# Patient Record
Sex: FEMALE | Race: WHITE | NOT HISPANIC OR LATINO | Employment: STUDENT | URBAN - METROPOLITAN AREA
[De-identification: names, ages, dates, MRNs, and addresses within clinical notes are randomized per-mention and may not be internally consistent; named-entity substitution may affect disease eponyms.]

---
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
## 2023-01-05 ENCOUNTER — APPOINTMENT (OUTPATIENT)
Dept: LAB | Facility: CLINIC | Age: 18
End: 2023-01-05

## 2023-01-05 ENCOUNTER — ROUTINE PRENATAL (OUTPATIENT)
Dept: OBGYN CLINIC | Age: 18
End: 2023-01-05

## 2023-01-05 ENCOUNTER — TELEPHONE (OUTPATIENT)
Dept: OBGYN CLINIC | Facility: CLINIC | Age: 18
End: 2023-01-05

## 2023-01-05 VITALS
DIASTOLIC BLOOD PRESSURE: 80 MMHG | HEIGHT: 60 IN | BODY MASS INDEX: 24.54 KG/M2 | WEIGHT: 125 LBS | SYSTOLIC BLOOD PRESSURE: 116 MMHG

## 2023-01-05 DIAGNOSIS — Z3A.30 30 WEEKS GESTATION OF PREGNANCY: ICD-10-CM

## 2023-01-05 DIAGNOSIS — Z3A.30 30 WEEKS GESTATION OF PREGNANCY: Primary | ICD-10-CM

## 2023-01-05 DIAGNOSIS — O99.013 ANEMIA AFFECTING PREGNANCY IN THIRD TRIMESTER: ICD-10-CM

## 2023-01-05 LAB
ABO GROUP BLD: NORMAL
BLD GP AB SCN SERPL QL: NEGATIVE
DME PARACHUTE DELIVERY DATE REQUESTED: NORMAL
DME PARACHUTE ITEM DESCRIPTION: NORMAL
DME PARACHUTE ORDER STATUS: NORMAL
DME PARACHUTE SUPPLIER NAME: NORMAL
DME PARACHUTE SUPPLIER PHONE: NORMAL
RH BLD: NEGATIVE
SL AMB  POCT GLUCOSE, UA: NEGATIVE
SL AMB POCT URINE PROTEIN: NEGATIVE
SPECIMEN EXPIRATION DATE: NORMAL

## 2023-01-05 RX ORDER — FERROUS SULFATE TAB EC 324 MG (65 MG FE EQUIVALENT) 324 (65 FE) MG
324 TABLET DELAYED RESPONSE ORAL
Qty: 30 TABLET | Refills: 2 | Status: SHIPPED | OUTPATIENT
Start: 2023-01-05

## 2023-01-05 NOTE — PROGRESS NOTES
Patient is here for prenatal ob visit today  No question's or concerns at this time  GA: 30w4d  Estimated Date of Delivery: 3/12/23    Urine: Protein Negative / Glucose Negative  Denies loss of fluid, vaginal bleeding and contractions  Good fetal movement  Labs are completed and up to date  Declines Flu and Tdap vaccines

## 2023-01-05 NOTE — PATIENT INSTRUCTIONS
Pregnancy at 31 to 34 Weeks   AMBULATORY CARE:   Changes happening with your body: You may continue to have symptoms such as shortness of breath, heartburn, contractions, or swelling of your ankles and feet  You may be gaining about 1 pound a week now  Seek care immediately if:   You develop a severe headache that does not go away  You have new or increased vision changes, such as blurred or spotted vision  You have new or increased swelling in your face or hands  You have vaginal spotting or bleeding  Your water broke or you feel warm water gushing or trickling from your vagina  Call your obstetrician if:   You have more than 5 contractions in 1 hour  You notice any changes in your baby's movements  You have abdominal cramps, pressure, or tightening  You have a change in vaginal discharge  You have chills or a fever  You have vaginal itching, burning, or pain  You have yellow, green, white, or foul-smelling vaginal discharge  You have pain or burning when you urinate, less urine than usual, or pink or bloody urine  You have questions or concerns about your condition or care  How to care for yourself at this stage of your pregnancy:       Eat a variety of healthy foods  Healthy foods include fruits, vegetables, whole-grain breads, low-fat dairy foods, beans, lean meats, and fish  Drink liquids as directed  Ask how much liquid to drink each day and which liquids are best for you  Limit caffeine to less than 200 milligrams each day  Limit your intake of fish to 2 servings each week  Choose fish low in mercury such as canned light tuna, shrimp, salmon, cod, or tilapia  Do not  eat fish high in mercury such as swordfish, tilefish, von mackerel, and shark  Manage heartburn  by eating 4 or 5 small meals each day instead of large meals  Avoid spicy food  Manage swelling  by lying down and putting your feet up  Take prenatal vitamins as directed    Your need for certain vitamins and minerals, such as folic acid, increases during pregnancy  Prenatal vitamins provide some of the extra vitamins and minerals you need  Prenatal vitamins may also help to decrease the risk of certain birth defects  Talk to your healthcare provider about exercise  Moderate exercise can help you stay fit  Your healthcare provider will help you plan an exercise program that is safe for you during pregnancy  Do not smoke  Smoking increases your risk of a miscarriage and other health problems during your pregnancy  Smoking can cause your baby to be born too early or weigh less at birth  Ask your healthcare provider for information if you need help quitting  Do not drink alcohol  Alcohol passes from your body to your baby through the placenta  It can affect your baby's brain development and cause fetal alcohol syndrome (FAS)  FAS is a group of conditions that causes mental, behavior, and growth problems  Talk to your healthcare provider before you take any medicines  Many medicines may harm your baby if you take them when you are pregnant  Do not take any medicines, vitamins, herbs, or supplements without first talking to your healthcare provider  Never use illegal or street drugs (such as marijuana or cocaine) while you are pregnant  Safety tips during pregnancy:   Avoid hot tubs and saunas  Do not use a hot tub or sauna while you are pregnant, especially during your first trimester  Hot tubs and saunas may raise your baby's temperature and increase the risk of birth defects  Avoid toxoplasmosis  This is an infection caused by eating raw meat or being around infected cat feces  It can cause birth defects, miscarriages, and other problems  Wash your hands after you touch raw meat  Make sure any meat is well-cooked before you eat it  Avoid raw eggs and unpasteurized milk  Use gloves or ask someone else to clean your cat's litter box while you are pregnant  Changes happening with your baby:  By 34 weeks, your baby may weigh more than 5 pounds  Your baby will be about 12 ½ inches long from the top of the head to the rump (baby's bottom)  Your baby is gaining about ½ pound a week  Your baby's eyes open and close now  Your baby's kicks and movements are more forceful at this time  What you need to know about prenatal care: Your healthcare provider will check your blood pressure and weight  You may also need the following:  A urine test  may also be done to check for sugar and protein  These can be signs of gestational diabetes or infection  Protein in your urine may also be a sign of preeclampsia  Preeclampsia is a condition that can develop during week 20 or later of your pregnancy  It causes high blood pressure, and it can cause problems with your kidneys and other organs  A gestational diabetes screen  may be done  Your healthcare provider may order either a 1-step or 2-step oral glucose tolerance test (OGTT)  1-step OGTT:  Your blood sugar level will be tested after you have not eaten for 8 hours (fasting)  You will then be given a glucose drink  Your level will be tested again 1 hour and 2 hours after you finish the drink  2-step OGTT:  You do not have to fast for the first part of the test  You will have the glucose drink at any time of day  Your blood sugar level will be checked 1 hour later  If your blood sugar is higher than a certain level, another test will be ordered  You will fast and your blood sugar level will be tested  You will have the glucose drink  Your blood will be tested again 1 hour, 2 hours, and 3 hours after you finish the glucose drink  A Tdap vaccine  may be recommended by your healthcare provider  Fundal height  is a measurement of your uterus to check your baby's growth  This number is usually the same as the number of weeks that you have been pregnant   Your healthcare provider may also check your baby's position  Your baby's heart rate  will be checked  Follow up with your obstetrician as directed:  Write down your questions so you remember to ask them during your visits  © Copyright [employer]  Information is for End User's use only and may not be sold, redistributed or otherwise used for commercial purposes  All illustrations and images included in CareNotes® are the copyrighted property of A D A M , Inc  or Kari Payan   The above information is an  only  It is not intended as medical advice for individual conditions or treatments  Talk to your doctor, nurse or pharmacist before following any medical regimen to see if it is safe and effective for you   Labor   AMBULATORY CARE:    (premature) labor  occurs when the uterus contracts and your cervix opens earlier than normal  The cervix is the opening of your uterus   labor happens after the 20th week of pregnancy but before the 37th week  You may have premature rupture of membranes (PROM)  PROM means your water broke before labor began  An early labor could cause you to have your baby before he or she is ready to be born  Common signs and symptoms include the following: You may not know that you are having  labor  It is common to have  contractions (tightening and relaxing of the uterus) and not notice them  The following are signs and symptoms that suggest a  labor:  Contractions that get stronger and closer together    Changes in vaginal discharge, such as more discharge or discharge that is watery or bloody     Low back pain     Pressure in the lower abdomen     Vaginal spotting or bleeding    Call your local emergency number (911 in the [address] [facility] [facility]) if:   You see or feel like there is something in your vagina  Call your doctor if:   You have bright red, painless vaginal bleeding  Your symptoms do not get better or they get worse      Your water broke or you feel warm water gushing or trickling from your vagina  You have contractions that get stronger and closer together for more than 1 hour  You notice a decrease in your baby's movement  You have abdominal cramps, pressure, or tightening  You have a change in vaginal discharge  You have a fever  You have burning when you urinate or you are urinating less than is normal for you  You have questions or concerns about your condition or care  How  labor is diagnosed: You may have one or more of the following tests to check for  labor:  A pelvic exam  is also called an internal or vaginal exam  During a pelvic exam, your healthcare provider will gently put a warmed speculum into your vagina  A speculum is a tool that opens your vagina  This lets your healthcare provider see if your cervix is opening  A vaginal ultrasound  uses sound waves to show pictures of your cervix and your baby inside your uterus  During this test, a small tube is placed into your vagina  This test will help your healthcare provider see if your cervix is opening  A fetal ultrasound  uses sound waves to show pictures of your baby inside your uterus  The movement, heart rate, and position of your baby can also be seen  A fetal fibronectin test  checks for a protein called fetal fibronectin in the cervix or vagina  Normally, there is no protein in cervical and vaginal secretions until the 20th week of pregnancy up to the end of pregnancy  Blood and urine tests  may be done to look for signs of infection  Treatment for  labor  may delay delivery  You may need any of the following:  Bed rest  may be recommended  You may need to lie on your left side, which improves circulation to the uterus and baby  Your healthcare provider will tell you when it is okay to get out of bed  Medicine  may be given to stop contractions if your baby is not ready to be born   You may also need certain medicines if your  labor cannot be stopped and your healthcare provider thinks you will have your baby early  These medicines help your baby's lungs, brain, and digestive organs mature  They also help decrease your baby's risk of being born with cerebral palsy  If you have PROM, fluid from your vagina or rectum will be checked for a strep infection  You may be given antibiotics to prevent a strep infection during delivery  Antibiotics may also be used to prevent labor from starting  You may also need steroids to decrease the risk for complications due to  labor  Self-care:   Rest  as much as possible  You may need to lie on your left side to improve circulation to the uterus and baby  You may be able to prevent  labor by resting and reducing your physical activity  Ask your healthcare provider about activities that are safe for you to do  Your healthcare provider or obstetrician may recommend that you avoid sexual intercourse  Ask your healthcare provider if exercise is safe  Drink liquids as directed  Ask how much liquid to drink each day and which liquids are best for you  Do not smoke  Your baby may not grow well and he or she may weigh less at birth if you smoke during pregnancy  Smoking also increases the risk that your baby will be born too early  Nicotine and other chemicals in cigarettes and cigars can cause lung damage  Ask your healthcare provider for information if you currently smoke and need help to quit  E-cigarettes or smokeless tobacco still contain nicotine  Talk to your healthcare provider before you use these products  Do not drink alcohol  Alcohol may harm your unborn baby and cause  labor  Maintain a healthy weight  A healthy weight may prevent  labor  Ask your healthcare provider how much weight you should gain during your pregnancy      Follow up with your doctor as directed:  Write down your questions so you remember to ask them during your visits  © Copyright [employer] 2022 Information is for End User's use only and may not be sold, redistributed or otherwise used for commercial purposes  All illustrations and images included in CareNotes® are the copyrighted property of A D A M , Inc  or Kari Park  The above information is an  only  It is not intended as medical advice for individual conditions or treatments  Talk to your doctor, nurse or pharmacist before following any medical regimen to see if it is safe and effective for you

## 2023-01-05 NOTE — PROGRESS NOTES
Problem   30 Weeks Gestation of Pregnancy       GC/CT -  Neg/neg  PN1 Labs: nml but no T+S   - 28 Week Labs- mild anemia  On oral iron  T&S not done - reordered today  - COVID vaccine- declined  Did not review increased risk of maternal/fetal complication today  - Flu/tdap vaccine -declined  - delivery consent signed       30 weeks gestation of pregnancy  Silver Crabtree is a 21 y o    30w4d who presents for routine PNV  28 week labs reviewed: mild anemia  Not currently taking oral iron supplements  Ordered today  Needs T&S  Opted for 7 days of fasting and postprandial glucose checks  Patient showed blood sugar log from her phone - fasting levels <95  Only 2 slightly elevated postprandial values at 123 and 126 after breakfast  Patient states both days she had fruit and syrup with her meal  Otherwise within normal limits  Advised patient she can discontinue finger sticks  Discussed avoiding excess sugar and carbs and making good food choices going forward  I advised patient to screenshot and submit the blood sugar log through [employer] for proper record keeping  TWG appropriate for prepregnancy BMI  T&S reordered  Expressed importance of completing this lab work as soon as possible  Patient agrees to go to lab immediately after this visit  Patient is aware she will have to return for rhogam injection if Rh negative  Prepregnancy BMI 19 33 with a goal weight gain 11 5 kg (25 lb)-16 kg (35 lb)  TWG 11 8 kg (26 lb)  Denies OB complaints  Good fetal movement  Denies contractions, cramping, leakage of fluid or vaginal bleeding  Declined tdap/flu/covid vaccines  Breast pump order submitted  Reviewed  labor precautions and FKCs     Pregnancy Essential guide and Baby and Me web site recommended

## 2023-01-05 NOTE — ASSESSMENT & PLAN NOTE
Cassidy Weinstein is a 21 y o    30w4d who presents for routine PNV  28 week labs reviewed: mild anemia  Not currently taking oral iron supplements  Ordered today  Needs T&S  Opted for 7 days of fasting and postprandial glucose checks  Patient showed blood sugar log from her phone - fasting levels <95  Only 2 slightly elevated postprandial values at 123 and 126 after breakfast  Patient states both days she had fruit and syrup with her meal  Otherwise within normal limits  Advised patient she can discontinue finger sticks  Discussed avoiding excess sugar and carbs and making good food choices going forward  I advised patient to screenshot and submit the blood sugar log through [employer] for proper record keeping  TWG appropriate for prepregnancy BMI  T&S reordered  Expressed importance of completing this lab work as soon as possible  Patient agrees to go to lab immediately after this visit  Patient is aware she will have to return for rhogam injection if Rh negative  Prepregnancy BMI 19 33 with a goal weight gain 11 5 kg (25 lb)-16 kg (35 lb)  TWG 11 8 kg (26 lb)  Denies OB complaints  Good fetal movement  Denies contractions, cramping, leakage of fluid or vaginal bleeding  Declined tdap/flu/covid vaccines  Breast pump order submitted  Reviewed  labor precautions and FKCs     Pregnancy Essential guide and Baby and Me web site recommended

## 2023-01-05 NOTE — TELEPHONE ENCOUNTER
Spoke to pt and she said she got through to someone to make an appt- that is what she wanted  Reminded her if she leaves vm in future to leave her name and  with phone # so we can find her  Her # does not for some reason read aloud

## 2023-01-05 NOTE — TELEPHONE ENCOUNTER
Pt lm on OB vm ( her phone # is not dictated by the system- says "external #" only)  Pt only stated her name- no reason for call

## 2023-01-06 ENCOUNTER — TELEPHONE (OUTPATIENT)
Dept: OBGYN CLINIC | Facility: CLINIC | Age: 18
End: 2023-01-06

## 2023-01-06 NOTE — TELEPHONE ENCOUNTER
----- Message from Denzel De La Cruz sent at 1/6/2023  7:27 AM EST -----  Blood type O negative  Needs rhogam injection

## 2023-01-06 NOTE — TELEPHONE ENCOUNTER
----- Message from [address] Christo Jansen sent at 1/6/2023  2:13 PM EST -----  Regarding: re appointment  Contact: [phone]  Yes, Jeremias Tapia been busy today so I wasn’t able to make it in today  So I will have to schedule for next week  Also I have a question, what if the baby was  a positive blood type, that would still affect me? That would mean I would still need it?

## 2023-01-06 NOTE — TELEPHONE ENCOUNTER
Called pt- informed pt she will need rhogam asap, & recommend she reschedule her missed appt today  Advised pt that baby's blood will be tested after june & if Rh negative, will not need a 2nd rhogam injection after june; if Rh positive, will need another rhogam injection PP  Pt states she will call back today to reschedule appt

## 2023-01-10 ENCOUNTER — CLINICAL SUPPORT (OUTPATIENT)
Dept: OBGYN CLINIC | Age: 18
End: 2023-01-10

## 2023-01-10 VITALS
HEIGHT: 60 IN | WEIGHT: 125.6 LBS | SYSTOLIC BLOOD PRESSURE: 118 MMHG | BODY MASS INDEX: 24.66 KG/M2 | DIASTOLIC BLOOD PRESSURE: 72 MMHG

## 2023-01-10 DIAGNOSIS — O26.893 RH NEGATIVE STATUS DURING PREGNANCY IN THIRD TRIMESTER: Primary | ICD-10-CM

## 2023-01-10 DIAGNOSIS — Z67.91 RH NEGATIVE STATUS DURING PREGNANCY IN THIRD TRIMESTER: Primary | ICD-10-CM

## 2023-01-10 NOTE — PROGRESS NOTES
Pt is currently 31wks pregnant     Pt is here for Rhogam injection   No concerns at this time  O Negative     Given Right Buttock  Pt tolerated well

## 2023-01-15 NOTE — PATIENT INSTRUCTIONS
Thank you for choosing us for your  care today  If you have any questions about your ultrasound or care, please do not hesitate to contact us or your primary obstetrician  Some general instructions for your pregnancy are:    Exercise: Aim for 22 minutes per day (150 minutes per week) of regular exercise  Walking is great! Nutrition: Choose healthy sources of calcium, iron, and protein  Learn about Preeclampsia: preeclampsia is a common, serious high blood pressure complication in pregnancy  A blood pressure of 850PCML (systolic or top number) or 45WBZE (diastolic or bottom number) is not normal and needs evaluation by your doctor  Aspirin is sometimes prescribed in early pregnancy to prevent preeclampsia in women with risk factors - ask your obstetrician if you should be on this medication  For more resources, visit:  https://mothertobaby org/fact-sheets/low-dose-aspirin/  [employer] com cy  https://www highriskpregnancyinfo org/preeclampsia  If you smoke, try to reduce how many cigarettes you smoke or try to quit completely  Do not vape  Other warning signs to watch out for in pregnancy or postpartum: chest pain, obstructed breathing or shortness of breath, seizures, thoughts of hurting yourself or your baby, bleeding, a painful or swollen leg, fever, or headache (see [facility] POST-BIRTH Warning Signs campaign)  If these happen call 911  Itching is also not normal in pregnancy and if you experience this, especially over your hands and feet, potentially worse at night, notify your doctors

## 2023-01-16 PROBLEM — Z3A.32 32 WEEKS GESTATION OF PREGNANCY: Status: ACTIVE | Noted: 2022-08-29

## 2023-01-16 NOTE — ASSESSMENT & PLAN NOTE
She feels well  She denies LOF/CTX/VB  She did not have any concerns today  We discussed fetal kick counting  Heading to her MFM appt next

## 2023-01-16 NOTE — PATIENT INSTRUCTIONS
Pregnancy at [address] to [address]:   What changes are happening with my body? You may continue to have symptoms such as shortness of breath, heartburn, contractions, or swelling of your ankles and feet  You may be gaining about 1 pound a week now  How do I care for myself at this stage of my pregnancy? Eat a variety of healthy foods  Healthy foods include fruits, vegetables, whole-grain breads, low-fat dairy foods, beans, lean meats, and fish  Drink liquids as directed  Ask how much liquid to drink each day and which liquids are best for you  Limit caffeine to less than 200 milligrams each day  Limit your intake of fish to 2 servings each week  Choose fish low in mercury such as canned light tuna, shrimp, salmon, cod, or tilapia  Do not  eat fish high in mercury such as swordfish, tilefish, von mackerel, and shark  Manage heartburn  by eating 4 or 5 small meals each day instead of large meals  Avoid spicy food  Manage swelling  by lying down and putting your feet up  Take prenatal vitamins as directed  Your need for certain vitamins and minerals, such as folic acid, increases during pregnancy  Prenatal vitamins provide some of the extra vitamins and minerals you need  Prenatal vitamins may also help to decrease the risk of certain birth defects  Talk to your healthcare provider about exercise  Moderate exercise can help you stay fit  Your healthcare provider will help you plan an exercise program that is safe for you during pregnancy  Do not smoke  Smoking increases your risk of a miscarriage and other health problems during your pregnancy  Smoking can cause your baby to be born too early or weigh less at birth  Ask your healthcare provider for information if you need help quitting  Do not drink alcohol  Alcohol passes from your body to your baby through the placenta   It can affect your baby's brain development and cause fetal alcohol syndrome (FAS)  FAS is a group of conditions that causes mental, behavior, and growth problems  Talk to your healthcare provider before you take any medicines  Many medicines may harm your baby if you take them when you are pregnant  Do not take any medicines, vitamins, herbs, or supplements without first talking to your healthcare provider  Never use illegal or street drugs (such as marijuana or cocaine) while you are pregnant  What are some safety tips during pregnancy? Avoid hot tubs and saunas  Do not use a hot tub or sauna while you are pregnant, especially during your first trimester  Hot tubs and saunas may raise your baby's temperature and increase the risk of birth defects  Avoid toxoplasmosis  This is an infection caused by eating raw meat or being around infected cat feces  It can cause birth defects, miscarriages, and other problems  Wash your hands after you touch raw meat  Make sure any meat is well-cooked before you eat it  Avoid raw eggs and unpasteurized milk  Use gloves or ask someone else to clean your cat's litter box while you are pregnant  What changes are happening with my baby? By 34 weeks, your baby may weigh more than 5 pounds  Your baby will be about 12 ½ inches long from the top of the head to the rump (baby's bottom)  Your baby is gaining about ½ pound a week  Your baby's eyes open and close now  Your baby's kicks and movements are more forceful at this time  What do I need to know about prenatal care? Your healthcare provider will check your blood pressure and weight  You may also need the following:  A urine test  may also be done to check for sugar and protein  These can be signs of gestational diabetes or infection  Protein in your urine may also be a sign of preeclampsia  Preeclampsia is a condition that can develop during week 20 or later of your pregnancy  It causes high blood pressure, and it can cause problems with your kidneys and other organs      A gestational diabetes screen  may be done  Your healthcare provider may order either a 1-step or 2-step oral glucose tolerance test (OGTT)  1-step OGTT:  Your blood sugar level will be tested after you have not eaten for 8 hours (fasting)  You will then be given a glucose drink  Your level will be tested again 1 hour and 2 hours after you finish the drink  2-step OGTT:  You do not have to fast for the first part of the test  You will have the glucose drink at any time of day  Your blood sugar level will be checked 1 hour later  If your blood sugar is higher than a certain level, another test will be ordered  You will fast and your blood sugar level will be tested  You will have the glucose drink  Your blood will be tested again 1 hour, 2 hours, and 3 hours after you finish the glucose drink  A Tdap vaccine  may be recommended by your healthcare provider  Fundal height  is a measurement of your uterus to check your baby's growth  This number is usually the same as the number of weeks that you have been pregnant  Your healthcare provider may also check your baby's position  Your baby's heart rate  will be checked  When should I seek immediate care? You develop a severe headache that does not go away  You have new or increased vision changes, such as blurred or spotted vision  You have new or increased swelling in your face or hands  You have vaginal spotting or bleeding  Your water broke or you feel warm water gushing or trickling from your vagina  When should I call my obstetrician? You have more than 5 contractions in 1 hour  You notice any changes in your baby's movements  You have abdominal cramps, pressure, or tightening  You have a change in vaginal discharge  You have chills or a fever  You have vaginal itching, burning, or pain  You have yellow, green, white, or foul-smelling vaginal discharge      You have pain or burning when you urinate, less urine than usual, or pink or bloody urine  You have questions or concerns about your condition or care  CARE AGREEMENT:   You have the right to help plan your care  Learn about your health condition and how it may be treated  Discuss treatment options with your healthcare providers to decide what care you want to receive  You always have the right to refuse treatment  The above information is an  only  It is not intended as medical advice for individual conditions or treatments  Talk to your doctor, nurse or pharmacist before following any medical regimen to see if it is safe and effective for you  © Copyright [employer] 2022 Information is for End User's use only and may not be sold, redistributed or otherwise used for commercial purposes   All illustrations and images included in CareNotes® are the copyrighted property of A D A M , Inc  or [address] [place] [address]

## 2023-01-17 ENCOUNTER — ULTRASOUND (OUTPATIENT)
Dept: PERINATAL CARE | Facility: OTHER | Age: 18
End: 2023-01-17

## 2023-01-17 ENCOUNTER — ROUTINE PRENATAL (OUTPATIENT)
Dept: OBGYN CLINIC | Age: 18
End: 2023-01-17

## 2023-01-17 VITALS — SYSTOLIC BLOOD PRESSURE: 110 MMHG | WEIGHT: 130 LBS | DIASTOLIC BLOOD PRESSURE: 82 MMHG | BODY MASS INDEX: 25.39 KG/M2

## 2023-01-17 VITALS
HEIGHT: 60 IN | WEIGHT: 129.8 LBS | DIASTOLIC BLOOD PRESSURE: 60 MMHG | SYSTOLIC BLOOD PRESSURE: 102 MMHG | BODY MASS INDEX: 25.48 KG/M2 | HEART RATE: 94 BPM

## 2023-01-17 DIAGNOSIS — Z33.1 ADOLESCENT PREGNANCY, INCIDENTAL: ICD-10-CM

## 2023-01-17 DIAGNOSIS — O43.192 MARGINAL INSERTION OF UMBILICAL CORD AFFECTING MANAGEMENT OF MOTHER IN SECOND TRIMESTER: Primary | ICD-10-CM

## 2023-01-17 DIAGNOSIS — Z3A.32 32 WEEKS GESTATION OF PREGNANCY: ICD-10-CM

## 2023-01-17 DIAGNOSIS — Z36.89 ENCOUNTER FOR ULTRASOUND TO CHECK FETAL GROWTH: ICD-10-CM

## 2023-01-17 DIAGNOSIS — O99.013 ANEMIA AFFECTING PREGNANCY IN THIRD TRIMESTER: ICD-10-CM

## 2023-01-17 DIAGNOSIS — Z34.93 PRENATAL CARE IN THIRD TRIMESTER: Primary | ICD-10-CM

## 2023-01-17 DIAGNOSIS — Z36.2 ENCOUNTER FOR FOLLOW-UP ULTRASOUND OF FETAL ANATOMY: ICD-10-CM

## 2023-01-17 PROBLEM — Z13.1 ENCOUNTER FOR SCREENING FOR DIABETES MELLITUS: Status: RESOLVED | Noted: 2022-12-21 | Resolved: 2023-01-17

## 2023-01-17 LAB
SL AMB  POCT GLUCOSE, UA: NORMAL
SL AMB POCT URINE PROTEIN: NORMAL

## 2023-01-17 NOTE — LETTER
Date: 2023    Lawrence Suero MD  [address] [place] [address]    Patient: Nikhil Reynoso   YOB: 2005   Date of Visit: 2023   Gestational age Mimi Audrey of this communication: Routine though please note we are seeing her back in 3 weeks for growth given borderline measurements today       Dear Erica Church,    This patient was seen recently in our  office  Please see ultrasound report under "OB Procedures" tab  Please don't hesitate to contact our office with any concerns or questions        Sincerely,      Stephen Gilbert MD  Attending Physician, [facility]

## 2023-01-17 NOTE — PROGRESS NOTES
[address]: Ms Vic Doherty was seen today for fetal growth assessment ultrasound  See ultrasound report under "OB Procedures" tab  The time spent on this established patient on the encounter date included 5 minutes previsit service time reviewing records and precharting, 5 minutes face-to-face service time counseling regarding results and coordinating care, and  4 minutes charting, totalling 14 minutes  Please don't hesitate to contact our office with any concerns or questions    Ronald Lou MD

## 2023-01-17 NOTE — PROGRESS NOTES
Problem   Anemia Affecting Pregnancy in Third Trimester   32 Weeks Gestation of Pregnancy       GC/CT -  Neg/neg  PN1 Labs: nml   Rhogham given 1/10/2023  - 28 Week Labs- mild anemia  On oral iron  Repeat CBC with anemia reflex panel ordered  FS done for 1 week and wnl   - COVID vaccine- declined  Did not review increased risk of maternal/fetal complication today  - Flu/tdap vaccine -declined  - delivery consent signed     Encounter for Screening for Diabetes Mellitus (Resolved)     32 weeks gestation of pregnancy  She feels well  She denies LOF/CTX/VB  She did not have any concerns today  We discussed fetal kick counting  Heading to her MFM appt next

## 2023-01-19 ENCOUNTER — TELEPHONE (OUTPATIENT)
Dept: PERINATAL CARE | Facility: OTHER | Age: 18
End: 2023-01-19

## 2023-01-19 NOTE — TELEPHONE ENCOUNTER
Sent patient inbox msg 1/19 at 8538 to schedule recommended follow up in 3 weeks (around 2/7/2023)  # listed on patients chart is not in service   # provided to patient in message

## 2023-01-30 LAB
DME PARACHUTE DELIVERY DATE REQUESTED: NORMAL
DME PARACHUTE ITEM DESCRIPTION: NORMAL
DME PARACHUTE ORDER STATUS: NORMAL
DME PARACHUTE SUPPLIER NAME: NORMAL
DME PARACHUTE SUPPLIER PHONE: NORMAL

## 2023-01-31 ENCOUNTER — ROUTINE PRENATAL (OUTPATIENT)
Dept: OBGYN CLINIC | Age: 18
End: 2023-01-31

## 2023-01-31 ENCOUNTER — APPOINTMENT (OUTPATIENT)
Dept: LAB | Facility: CLINIC | Age: 18
End: 2023-01-31

## 2023-01-31 VITALS
SYSTOLIC BLOOD PRESSURE: 120 MMHG | WEIGHT: 132 LBS | HEIGHT: 60 IN | BODY MASS INDEX: 25.91 KG/M2 | DIASTOLIC BLOOD PRESSURE: 68 MMHG

## 2023-01-31 DIAGNOSIS — O43.192 MARGINAL INSERTION OF UMBILICAL CORD AFFECTING MANAGEMENT OF MOTHER IN SECOND TRIMESTER: ICD-10-CM

## 2023-01-31 DIAGNOSIS — Z3A.34 34 WEEKS GESTATION OF PREGNANCY: ICD-10-CM

## 2023-01-31 DIAGNOSIS — O99.013 ANEMIA AFFECTING PREGNANCY IN THIRD TRIMESTER: ICD-10-CM

## 2023-01-31 DIAGNOSIS — Z34.83 ENCOUNTER FOR SUPERVISION OF OTHER NORMAL PREGNANCY, THIRD TRIMESTER: Primary | ICD-10-CM

## 2023-01-31 PROBLEM — O26.899 RH NEGATIVE STATE IN ANTEPARTUM PERIOD: Status: ACTIVE | Noted: 2023-01-31

## 2023-01-31 PROBLEM — Z67.91 RH NEGATIVE STATE IN ANTEPARTUM PERIOD: Status: ACTIVE | Noted: 2023-01-31

## 2023-01-31 LAB
BASOPHILS # BLD AUTO: 0.06 THOUSANDS/ÂΜL (ref 0–0.1)
BASOPHILS NFR BLD AUTO: 1 % (ref 0–1)
EOSINOPHIL # BLD AUTO: 0.14 THOUSAND/ÂΜL (ref 0–0.61)
EOSINOPHIL NFR BLD AUTO: 1 % (ref 0–6)
ERYTHROCYTE [DISTWIDTH] IN BLOOD BY AUTOMATED COUNT: 15.9 % (ref 11.6–15.1)
HCT VFR BLD AUTO: 33.5 % (ref 34.8–46.1)
HGB BLD-MCNC: 10.8 G/DL (ref 11.5–15.4)
IMM GRANULOCYTES # BLD AUTO: 0.09 THOUSAND/UL (ref 0–0.2)
IMM GRANULOCYTES NFR BLD AUTO: 1 % (ref 0–2)
LYMPHOCYTES # BLD AUTO: 2.73 THOUSANDS/ÂΜL (ref 0.6–4.47)
LYMPHOCYTES NFR BLD AUTO: 22 % (ref 14–44)
MCH RBC QN AUTO: 30.1 PG (ref 26.8–34.3)
MCHC RBC AUTO-ENTMCNC: 32.2 G/DL (ref 31.4–37.4)
MCV RBC AUTO: 93 FL (ref 82–98)
MONOCYTES # BLD AUTO: 1.35 THOUSAND/ÂΜL (ref 0.17–1.22)
MONOCYTES NFR BLD AUTO: 11 % (ref 4–12)
NEUTROPHILS # BLD AUTO: 7.8 THOUSANDS/ÂΜL (ref 1.85–7.62)
NEUTS SEG NFR BLD AUTO: 64 % (ref 43–75)
NRBC BLD AUTO-RTO: 0 /100 WBCS
PLATELET # BLD AUTO: 309 THOUSANDS/UL (ref 149–390)
PMV BLD AUTO: 10.3 FL (ref 8.9–12.7)
RBC # BLD AUTO: 3.59 MILLION/UL (ref 3.81–5.12)
SL AMB  POCT GLUCOSE, UA: NEGATIVE
SL AMB POCT URINE PROTEIN: NEGATIVE
WBC # BLD AUTO: 12.17 THOUSAND/UL (ref 4.31–10.16)

## 2023-01-31 NOTE — PROGRESS NOTES
21 y o   at 34w2d, here for routine OB visit  Feeling well overall and without concerns  Good FM  Denies LOF, VB, contractions  No questions/concerns  Problem   Rh Negative State in Antepartum Period    rhogam given 1/10/23     Marginal Insertion of Umbilical Cord Affecting Management of Mother in [address] [place] [address] for 35 week growth scan at [facility]: scheduled for 23     34 Weeks Gestation of Pregnancy    PN1 Labs: nml   - 28 Week Labs- mild anemia   FS done for 1 week and wnl   - flu/COVID/flu vaccine   - delivery consent signed  - needs birth plan signed  - GBS next visit         Problem List Items Addressed This Visit        Other    34 weeks gestation of pregnancy    Marginal insertion of umbilical cord affecting management of mother in second trimester   Other Visit Diagnoses     Encounter for supervision of other normal pregnancy, third trimester    -  Primary    Relevant Orders    POCT urine dip (Completed)
Pt is here for routine ob visit   No concerns at this time  Urine neg/neg   No LOF,VB,Contractions  +FM   UTD rhogam   Declined flu/tdap/covid vaccines   Delivery Consent signed
Normal rate, regular rhythm, normal S1, S2 heart sounds heard.

## 2023-02-15 ENCOUNTER — ROUTINE PRENATAL (OUTPATIENT)
Dept: OBGYN CLINIC | Age: 18
End: 2023-02-15

## 2023-02-15 VITALS
SYSTOLIC BLOOD PRESSURE: 116 MMHG | HEIGHT: 60 IN | WEIGHT: 135.4 LBS | BODY MASS INDEX: 26.58 KG/M2 | DIASTOLIC BLOOD PRESSURE: 84 MMHG

## 2023-02-15 DIAGNOSIS — Z3A.36 36 WEEKS GESTATION OF PREGNANCY: Primary | ICD-10-CM

## 2023-02-15 LAB
SL AMB  POCT GLUCOSE, UA: NEGATIVE
SL AMB POCT URINE PROTEIN: NEGATIVE

## 2023-02-15 NOTE — PATIENT INSTRUCTIONS
Pregnancy at 28 to 38 Weeks   AMBULATORY CARE:   Changes happening with your body: You are considered full term at the beginning of 37 weeks  Your breathing may be easier if your baby has moved down into a head-down position  You may need to urinate more often because the baby may be pressing on your bladder  You may also feel more discomfort and get tired easily  Seek care immediately if:   You develop a severe headache that does not go away  You have new or increased vision changes, such as blurred or spotted vision  You have new or increased swelling in your face or hands  You have vaginal spotting or bleeding  Your water broke or you feel warm water gushing or trickling from your vagina  Call your obstetrician if:   You have more than 5 contractions in 1 hour  You notice any changes in your baby's movements  You have abdominal cramps, pressure, or tightening  You have a change in vaginal discharge  You have chills or a fever  You have vaginal itching, burning, or pain  You have yellow, green, white, or foul-smelling vaginal discharge  You have pain or burning when you urinate, less urine than usual, or pink or bloody urine  You have questions or concerns about your condition or care  How to care for yourself at this stage of your pregnancy:       Eat a variety of healthy foods  Healthy foods include fruits, vegetables, whole-grain breads, low-fat dairy foods, beans, lean meats, and fish  Drink liquids as directed  Ask how much liquid to drink each day and which liquids are best for you  Limit caffeine to less than 200 milligrams each day  Limit your intake of fish to 2 servings each week  Choose fish low in mercury such as canned light tuna, shrimp, salmon, cod, or tilapia  Do not  eat fish high in mercury such as swordfish, tilefish, von mackerel, and shark  Take prenatal vitamins as directed    Your need for certain vitamins and minerals, such as folic acid, increases during pregnancy  Prenatal vitamins provide some of the extra vitamins and minerals you need  Prenatal vitamins may also help to decrease the risk of certain birth defects  Rest as needed  Put your feet up if you have swelling in your ankles and feet  Talk to your healthcare provider about exercise  Moderate exercise can help you stay fit  Your healthcare provider will help you plan an exercise program that is safe for you during pregnancy  Do not smoke  Smoking increases your risk of a miscarriage and other health problems during your pregnancy  Smoking can cause your baby to be born early or weigh less at birth  Ask your healthcare provider for information if you need help quitting  Do not drink alcohol  Alcohol passes from your body to your baby through the placenta  It can affect your baby's brain development and cause fetal alcohol syndrome (FAS)  FAS is a group of conditions that causes mental, behavior, and growth problems  Talk to your healthcare provider before you take any medicines  Many medicines may harm your baby if you take them when you are pregnant  Do not take any medicines, vitamins, herbs, or supplements without first talking to your healthcare provider  Never use illegal or street drugs (such as marijuana or cocaine) while you are pregnant  Safety tips during pregnancy:   Avoid hot tubs and saunas  Do not use a hot tub or sauna while you are pregnant, especially during your first trimester  Hot tubs and saunas may raise your baby's temperature and increase the risk of birth defects  Avoid toxoplasmosis  This is an infection caused by eating raw meat or being around infected cat feces  It can cause birth defects, miscarriages, and other problems  Wash your hands after you touch raw meat  Make sure any meat is well-cooked before you eat it  Avoid raw eggs and unpasteurized milk   Use gloves or ask someone else to clean your cat's litter box while you are pregnant  Ask your healthcare provider about travel  The most comfortable time to travel is during the second trimester  Ask your provider if you can travel after 36 weeks  You may not be able to travel in an airplane after 36 weeks  He or she may also recommend you avoid long road trips  Changes happening with your baby:  By 38 weeks, your baby may weigh between 6 and 9 pounds  Your baby may be about 14 inches long from the top of the head to the rump (baby's bottom)  Your baby hears well enough to know your voice  As your baby gets larger, you may feel fewer kicks and more stretching and rolling  Your baby may move into a head-down position  Your baby will also rest lower in your abdomen  What you need to know about prenatal care: Your healthcare provider will check your blood pressure and weight  You may also need the following:  A urine test  may also be done to check for sugar and protein  These can be signs of gestational diabetes or infection  Protein in your urine may also be a sign of preeclampsia  Preeclampsia is a condition that can develop during week 20 or later of your pregnancy  It causes high blood pressure, and it can cause problems with your kidneys and other organs  A gestational diabetes screen  may be done  Your healthcare provider may order either a 1-step or 2-step oral glucose tolerance test (OGTT)  1-step OGTT:  Your blood sugar level will be tested after you have not eaten for 8 hours (fasting)  You will then be given a glucose drink  Your level will be tested again 1 hour and 2 hours after you finish the drink  2-step OGTT:  You do not have to fast for the first part of the test  You will have the glucose drink at any time of day  Your blood sugar level will be checked 1 hour later  If your blood sugar is higher than a certain level, another test will be ordered  You will fast and your blood sugar level will be tested  You will have the glucose drink  Your blood will be tested again 1 hour, 2 hours, and 3 hours after you finish the glucose drink  A blood test  may be done to check for anemia (low iron level)  A Tdap vaccine  may be recommended by your healthcare provider  A group B strep test  is a test that is done to check for group B strep infection  Group B strep is a type of bacteria that may be found in the vagina or rectum  It can be passed to your baby during delivery if you have it  Your healthcare provider will take swab your vagina or rectum and send the sample to the lab for tests  Fundal height  is a measurement of your uterus to check your baby's growth  This number is usually the same as the number of weeks that you have been pregnant  Your healthcare provider may also check your baby's position  Your baby's heart rate  will be checked  Follow up with your obstetrician as directed:  Write down your questions so you remember to ask them during your visits  © Copyright [address]  Information is for End User's use only and may not be sold, redistributed or otherwise used for commercial purposes  All illustrations and images included in CareNotes® are the copyrighted property of [employer] D A M [employer] Inc  or [facility]  The above information is an  only  It is not intended as medical advice for individual conditions or treatments  Talk to your doctor, nurse or pharmacist before following any medical regimen to see if it is safe and effective for you   Labor   AMBULATORY CARE:    (premature) labor  occurs when the uterus contracts and your cervix opens earlier than normal  The cervix is the opening of your uterus   labor happens after the 20th week of pregnancy but before the 37th week  You may have premature rupture of membranes (PROM)  PROM means your water broke before labor began  An early labor could cause you to have your baby before he or she is ready to be born         Common signs and symptoms include the following: You may not know that you are having  labor  It is common to have  contractions (tightening and relaxing of the uterus) and not notice them  The following are signs and symptoms that suggest a  labor:  Contractions that get stronger and closer together    Changes in vaginal discharge, such as more discharge or discharge that is watery or bloody     Low back pain     Pressure in the lower abdomen     Vaginal spotting or bleeding    Call your local emergency number (911 in the [address]) if:   You see or feel like there is something in your vagina  Call your doctor if:   You have bright red, painless vaginal bleeding  Your symptoms do not get better or they get worse  Your water broke or you feel warm water gushing or trickling from your vagina  You have contractions that get stronger and closer together for more than 1 hour  You notice a decrease in your baby's movement  You have abdominal cramps, pressure, or tightening  You have a change in vaginal discharge  You have a fever  You have burning when you urinate or you are urinating less than is normal for you  You have questions or concerns about your condition or care  How  labor is diagnosed: You may have one or more of the following tests to check for  labor:  A pelvic exam  is also called an internal or vaginal exam  During a pelvic exam, your healthcare provider will gently put a warmed speculum into your vagina  A speculum is a tool that opens your vagina  This lets your healthcare provider see if your cervix is opening  A vaginal ultrasound  uses sound waves to show pictures of your cervix and your baby inside your uterus  During this test, a small tube is placed into your vagina  This test will help your healthcare provider see if your cervix is opening  A fetal ultrasound  uses sound waves to show pictures of your baby inside your uterus   The movement, heart rate, and position of your baby can also be seen  A fetal fibronectin test  checks for a protein called fetal fibronectin in the cervix or vagina  Normally, there is no protein in cervical and vaginal secretions until the 20th week of pregnancy up to the end of pregnancy  Blood and urine tests  may be done to look for signs of infection  Treatment for  labor  may delay delivery  You may need any of the following:  Bed rest  may be recommended  You may need to lie on your left side, which improves circulation to the uterus and baby  Your healthcare provider will tell you when it is okay to get out of bed  Medicine  may be given to stop contractions if your baby is not ready to be born  You may also need certain medicines if your  labor cannot be stopped and your healthcare provider thinks you will have your baby early  These medicines help your baby's lungs, brain, and digestive organs mature  They also help decrease your baby's risk of being born with cerebral palsy  If you have PROM, fluid from your vagina or rectum will be checked for a strep infection  You may be given antibiotics to prevent a strep infection during delivery  Antibiotics may also be used to prevent labor from starting  You may also need steroids to decrease the risk for complications due to  labor  Self-care:   Rest  as much as possible  You may need to lie on your left side to improve circulation to the uterus and baby  You may be able to prevent  labor by resting and reducing your physical activity  Ask your healthcare provider about activities that are safe for you to do  Your healthcare provider or obstetrician may recommend that you avoid sexual intercourse  Ask your healthcare provider if exercise is safe  Drink liquids as directed  Ask how much liquid to drink each day and which liquids are best for you  Do not smoke    Your baby may not grow well and he or she may weigh less at birth if you smoke during pregnancy  Smoking also increases the risk that your baby will be born too early  Nicotine and other chemicals in cigarettes and cigars can cause lung damage  Ask your healthcare provider for information if you currently smoke and need help to quit  E-cigarettes or smokeless tobacco still contain nicotine  Talk to your healthcare provider before you use these products  Do not drink alcohol  Alcohol may harm your unborn baby and cause  labor  Maintain a healthy weight  A healthy weight may prevent  labor  Ask your healthcare provider how much weight you should gain during your pregnancy  Follow up with your doctor as directed:  Write down your questions so you remember to ask them during your visits  © Copyright [employer]  Information is for End User's use only and may not be sold, redistributed or otherwise used for commercial purposes  All illustrations and images included in CareNotes® are the copyrighted property of [employer]  or Kari Payan   The above information is an  only  It is not intended as medical advice for individual conditions or treatments  Talk to your doctor, nurse or pharmacist before following any medical regimen to see if it is safe and effective for you

## 2023-02-15 NOTE — PROGRESS NOTES
Patient is here for prenatal ob visit today  No question's or concerns at this time  GA: 36w3d  Estimated Date of Delivery: 3/12/23    Urine: Protein Negative / Glucose Negative  Denies loss of fluid, vaginal bleeding and contractions  Good fetal movement  Labs are completed and up to date  Up to date with Rhogam   Declines Tdap vaccine

## 2023-02-15 NOTE — PROGRESS NOTES
Problem   36 Weeks Gestation of Pregnancy    PN1 Labs: nml   - 28 Week Labs- mild anemia  FS done for 1 week and wnl   - flu/COVID/flu vaccine   - delivery consent signed  - needs birth plan signed       36 weeks gestation of pregnancy  [place] Aguila is a 21 y o    36w3d who presents for routine PNV  Prepregnancy BMI 19 33 with a goal weight gain 11 5 kg (25 lb)-16 kg (35 lb)  TWG 16 5 kg (36 lb 6 4 oz)    Denies OB complaints  Good fetal movement  Denies CTX/LOF/VB  Patient declined GBS swab  She states it does not run in her family and she is asymptomatic so she does not believe it is necessary  Advised patient that GBS may be apart of the natural vaginal karlene so it is unlikely to cause an infection or symptoms in herself but can cause serious infections in neonates if exposed and left untreated that may result in adverse fetal outcomes up to and including demise  After reviewing the risks and benefits of GBS swab with patient and FOB, she agreed to testing but only through self-swabbing  Instructions provided and patient completed self-administered swab  Reviewed early signs of labor and FKCs  Growth scan scheduled on   RTO in 1 week

## 2023-02-15 NOTE — ASSESSMENT & PLAN NOTE
Familia Bobo is a 21 y o    36w3d who presents for routine PNV  Prepregnancy BMI 19 33 with a goal weight gain 11 5 kg (25 lb)-16 kg (35 lb)  TWG 16 5 kg (36 lb 6 4 oz)    Denies OB complaints  Good fetal movement  Denies CTX/LOF/VB  Patient declined GBS swab  She states it does not run in her family and she is asymptomatic so she does not believe it is necessary  Advised patient that GBS may be apart of the natural vaginal karlene so it is unlikely to cause an infection or symptoms in herself but can cause serious infections in neonates if exposed and left untreated that may result in adverse fetal outcomes up to and including demise  After reviewing the risks and benefits of GBS swab with patient and FOB, she agreed to testing but only through self-swabbing  Instructions provided and patient completed self-administered swab  Reviewed early signs of labor and FKCs  Growth scan scheduled on   RTO in 1 week

## 2023-02-16 PROBLEM — O43.193 MARGINAL INSERTION OF UMBILICAL CORD AFFECTING MANAGEMENT OF MOTHER IN THIRD TRIMESTER: Status: ACTIVE | Noted: 2022-10-25

## 2023-02-16 PROBLEM — Z3A.37 37 WEEKS GESTATION OF PREGNANCY: Status: ACTIVE | Noted: 2022-08-29

## 2023-02-16 PROBLEM — O43.192 MARGINAL INSERTION OF UMBILICAL CORD AFFECTING MANAGEMENT OF MOTHER IN SECOND TRIMESTER: Status: RESOLVED | Noted: 2022-10-25 | Resolved: 2023-02-16

## 2023-02-17 LAB — GP B STREP DNA SPEC QL NAA+PROBE: POSITIVE

## 2023-02-20 ENCOUNTER — ULTRASOUND (OUTPATIENT)
Dept: PERINATAL CARE | Facility: OTHER | Age: 18
End: 2023-02-20

## 2023-02-20 VITALS
DIASTOLIC BLOOD PRESSURE: 66 MMHG | HEIGHT: 60 IN | HEART RATE: 95 BPM | WEIGHT: 136.8 LBS | SYSTOLIC BLOOD PRESSURE: 108 MMHG | BODY MASS INDEX: 26.86 KG/M2

## 2023-02-20 DIAGNOSIS — O43.193 MARGINAL INSERTION OF UMBILICAL CORD AFFECTING MANAGEMENT OF MOTHER IN THIRD TRIMESTER: ICD-10-CM

## 2023-02-20 DIAGNOSIS — Z3A.37 37 WEEKS GESTATION OF PREGNANCY: Primary | ICD-10-CM

## 2023-02-20 DIAGNOSIS — Z36.89 ENCOUNTER FOR ULTRASOUND TO CHECK FETAL GROWTH: ICD-10-CM

## 2023-02-20 NOTE — PROGRESS NOTES
[address]: Ms Joanne Alexander was seen today for fetal growth assessment ultrasound  See ultrasound report under "OB Procedures" tab  The time spent on this established patient on the encounter date included 5 minutes previsit service time reviewing records and precharting, 5 minutes face-to-face service time counseling regarding results and coordinating care, and  5 minutes charting, totalling 15 minutes    Please don't hesitate to contact our office with any concerns or questions   -Aravind Stanley MD

## 2023-02-20 NOTE — PATIENT INSTRUCTIONS
Thank you for choosing us for your  care today  If you have any questions about your ultrasound or care, please do not hesitate to contact us or your primary obstetrician  Some general instructions for your pregnancy are:    Protect against coronavirus: get vaccinated - pregnant women are increased risk of severe COVID  Notify your primary care doctor if you have any symptoms  Exercise: Aim for 22 minutes per day (150 minutes per week) of regular exercise  Walking is great! Nutrition: aim for calcium-rich and iron-rich foods as well as healthy sources of protein  Learn about Preeclampsia: preeclampsia is a common, serious high blood pressure complication in pregnancy  A blood pressure of 906LTKM (systolic or top number) or 66UWUN (diastolic or bottom number) is not normal and needs evaluation by your doctor  Aspirin is sometimes prescribed in early pregnancy to prevent preeclampsia in women with risk factors - ask your obstetrician if you should be on this medication  If you smoke, try to reduce how many cigarettes you smoke or try to quit completely  Do not vape  Other warning signs to watch out for in pregnancy or postpartum: chest pain, obstructed breathing or shortness of breath, seizures, thoughts of hurting yourself or your baby, bleeding, a painful or swollen leg, fever, or headache (see [facility] POST-BIRTH Warning Signs campaign)  If these happen call 911  Itching is also not normal in pregnancy and if you experience this, especially over your hands and feet, potentially worse at night, notify your doctors

## 2023-02-21 ENCOUNTER — TELEPHONE (OUTPATIENT)
Dept: OBGYN CLINIC | Age: 18
End: 2023-02-21

## 2023-02-21 NOTE — TELEPHONE ENCOUNTER
Called patient regarding missed OB appointment on 02/21/2023, phone # not in service, will send [facility] message

## 2023-02-22 PROBLEM — B95.1 POSITIVE GBS TEST: Status: ACTIVE | Noted: 2023-02-22

## 2023-02-22 NOTE — ASSESSMENT & PLAN NOTE
She feels well  She denies LOF/CTX/VB  She voiced no concerns  Discussed fetal kick counting  She was encouraged to start with her perineal/vaginal massages to prevent lacerations during the labor process  Encouraged fluids today since her urine was a bit concentrated  She declined a cervical check today  Also does not think she will elect for an IOL but we will discuss again on return visits

## 2023-02-22 NOTE — PATIENT INSTRUCTIONS
Pregnancy at 28 to 38 Weeks   AMBULATORY CARE:   Changes happening with your body: You are considered full term at the beginning of 37 weeks  Your breathing may be easier if your baby has moved down into a head-down position  You may need to urinate more often because the baby may be pressing on your bladder  You may also feel more discomfort and get tired easily  Seek care immediately if:   You develop a severe headache that does not go away  You have new or increased vision changes, such as blurred or spotted vision  You have new or increased swelling in your face or hands  You have vaginal spotting or bleeding  Your water broke or you feel warm water gushing or trickling from your vagina  Call your obstetrician if:   You have more than 5 contractions in 1 hour  You notice any changes in your baby's movements  You have abdominal cramps, pressure, or tightening  You have a change in vaginal discharge  You have chills or a fever  You have vaginal itching, burning, or pain  You have yellow, green, white, or foul-smelling vaginal discharge  You have pain or burning when you urinate, less urine than usual, or pink or bloody urine  You have questions or concerns about your condition or care  How to care for yourself at this stage of your pregnancy:       Eat a variety of healthy foods  Healthy foods include fruits, vegetables, whole-grain breads, low-fat dairy foods, beans, lean meats, and fish  Drink liquids as directed  Ask how much liquid to drink each day and which liquids are best for you  Limit caffeine to less than 200 milligrams each day  Limit your intake of fish to 2 servings each week  Choose fish low in mercury such as canned light tuna, shrimp, salmon, cod, or tilapia  Do not  eat fish high in mercury such as swordfish, tilefish, von mackerel, and shark  Take prenatal vitamins as directed    Your need for certain vitamins and minerals, such as folic acid, increases during pregnancy  Prenatal vitamins provide some of the extra vitamins and minerals you need  Prenatal vitamins may also help to decrease the risk of certain birth defects  Rest as needed  Put your feet up if you have swelling in your ankles and feet  Talk to your healthcare provider about exercise  Moderate exercise can help you stay fit  Your healthcare provider will help you plan an exercise program that is safe for you during pregnancy  Do not smoke  Smoking increases your risk of a miscarriage and other health problems during your pregnancy  Smoking can cause your baby to be born early or weigh less at birth  Ask your healthcare provider for information if you need help quitting  Do not drink alcohol  Alcohol passes from your body to your baby through the placenta  It can affect your baby's brain development and cause fetal alcohol syndrome (FAS)  FAS is a group of conditions that causes mental, behavior, and growth problems  Talk to your healthcare provider before you take any medicines  Many medicines may harm your baby if you take them when you are pregnant  Do not take any medicines, vitamins, herbs, or supplements without first talking to your healthcare provider  Never use illegal or street drugs (such as marijuana or cocaine) while you are pregnant  Safety tips during pregnancy:   Avoid hot tubs and saunas  Do not use a hot tub or sauna while you are pregnant, especially during your first trimester  Hot tubs and saunas may raise your baby's temperature and increase the risk of birth defects  Avoid toxoplasmosis  This is an infection caused by eating raw meat or being around infected cat feces  It can cause birth defects, miscarriages, and other problems  Wash your hands after you touch raw meat  Make sure any meat is well-cooked before you eat it  Avoid raw eggs and unpasteurized milk   Use gloves or ask someone else to clean your cat's litter box while you are pregnant  Ask your healthcare provider about travel  The most comfortable time to travel is during the second trimester  Ask your provider if you can travel after 36 weeks  You may not be able to travel in an airplane after 36 weeks  He or she may also recommend you avoid long road trips  Changes happening with your baby:  By 38 weeks, your baby may weigh between 6 and 9 pounds  Your baby may be about 14 inches long from the top of the head to the rump (baby's bottom)  Your baby hears well enough to know your voice  As your baby gets larger, you may feel fewer kicks and more stretching and rolling  Your baby may move into a head-down position  Your baby will also rest lower in your abdomen  What you need to know about prenatal care: Your healthcare provider will check your blood pressure and weight  You may also need the following:  A urine test  may also be done to check for sugar and protein  These can be signs of gestational diabetes or infection  Protein in your urine may also be a sign of preeclampsia  Preeclampsia is a condition that can develop during week 20 or later of your pregnancy  It causes high blood pressure, and it can cause problems with your kidneys and other organs  A gestational diabetes screen  may be done  Your healthcare provider may order either a 1-step or 2-step oral glucose tolerance test (OGTT)  1-step OGTT:  Your blood sugar level will be tested after you have not eaten for 8 hours (fasting)  You will then be given a glucose drink  Your level will be tested again 1 hour and 2 hours after you finish the drink  2-step OGTT:  You do not have to fast for the first part of the test  You will have the glucose drink at any time of day  Your blood sugar level will be checked 1 hour later  If your blood sugar is higher than a certain level, another test will be ordered  You will fast and your blood sugar level will be tested  You will have the glucose drink  Your blood will be tested again 1 hour, 2 hours, and 3 hours after you finish the glucose drink  A blood test  may be done to check for anemia (low iron level)  A Tdap vaccine  may be recommended by your healthcare provider  A group B strep test  is a test that is done to check for group B strep infection  Group B strep is a type of bacteria that may be found in the vagina or rectum  It can be passed to your baby during delivery if you have it  Your healthcare provider will take swab your vagina or rectum and send the sample to the lab for tests  Fundal height  is a measurement of your uterus to check your baby's growth  This number is usually the same as the number of weeks that you have been pregnant  Your healthcare provider may also check your baby's position  Your baby's heart rate  will be checked  Follow up with your obstetrician as directed:  Write down your questions so you remember to ask them during your visits  © Copyright [facility] 2022 Information is for End User's use only and may not be sold, redistributed or otherwise used for commercial purposes  The above information is an  only  It is not intended as medical advice for individual conditions or treatments  Talk to your doctor, nurse or pharmacist before following any medical regimen to see if it is safe and effective for you

## 2023-02-23 ENCOUNTER — ROUTINE PRENATAL (OUTPATIENT)
Dept: OBGYN CLINIC | Age: 18
End: 2023-02-23

## 2023-02-23 VITALS
SYSTOLIC BLOOD PRESSURE: 124 MMHG | BODY MASS INDEX: 26.56 KG/M2 | DIASTOLIC BLOOD PRESSURE: 76 MMHG | WEIGHT: 136 LBS | HEART RATE: 82 BPM

## 2023-02-23 DIAGNOSIS — O26.899 RH NEGATIVE STATE IN ANTEPARTUM PERIOD: ICD-10-CM

## 2023-02-23 DIAGNOSIS — Z33.1 ADOLESCENT PREGNANCY, INCIDENTAL: ICD-10-CM

## 2023-02-23 DIAGNOSIS — O99.013 ANEMIA AFFECTING PREGNANCY IN THIRD TRIMESTER: ICD-10-CM

## 2023-02-23 DIAGNOSIS — Z34.83 ENCOUNTER FOR SUPERVISION OF OTHER NORMAL PREGNANCY, THIRD TRIMESTER: Primary | ICD-10-CM

## 2023-02-23 DIAGNOSIS — Z67.91 RH NEGATIVE STATE IN ANTEPARTUM PERIOD: ICD-10-CM

## 2023-02-23 DIAGNOSIS — Z3A.37 37 WEEKS GESTATION OF PREGNANCY: ICD-10-CM

## 2023-02-23 DIAGNOSIS — O43.193 MARGINAL INSERTION OF UMBILICAL CORD AFFECTING MANAGEMENT OF MOTHER IN THIRD TRIMESTER: ICD-10-CM

## 2023-02-23 DIAGNOSIS — B95.1 POSITIVE GBS TEST: ICD-10-CM

## 2023-02-23 LAB
SL AMB  POCT GLUCOSE, UA: ABNORMAL
SL AMB POCT URINE PROTEIN: ABNORMAL

## 2023-02-23 NOTE — PROGRESS NOTES
Routine OB 37w4d  Overall feels well  Denies any loss of fluid, bleeding or  regular contractions  She confirms good fetal movement  Urine Dip NEG for glucose and TRACE for protein  Educated on increasing water intake   Also educated on starting her vaginal massage  Concerns for today none to offer-declines internal exam

## 2023-02-23 NOTE — PROGRESS NOTES
Problem   37 Weeks Gestation of Pregnancy    PN1 Labs: nml   - 28 Week Labs- mild anemia  FS done for 1 week and wnl   - flu/COVID/TDAP vaccines declined  - delivery consent signed  - needs birth plan signed  -GBS positive  -IOL declined for now         37 weeks gestation of pregnancy  She feels well  She denies LOF/CTX/VB  She voiced no concerns  Discussed fetal kick counting  She was encouraged to start with her perineal/vaginal massages to prevent lacerations during the labor process  Encouraged fluids today since her urine was a bit concentrated  She declined a cervical check today  Also does not think she will elect for an IOL but we will discuss again on return visits

## 2023-02-28 ENCOUNTER — ROUTINE PRENATAL (OUTPATIENT)
Dept: OBGYN CLINIC | Age: 18
End: 2023-02-28

## 2023-02-28 VITALS
DIASTOLIC BLOOD PRESSURE: 80 MMHG | WEIGHT: 141 LBS | BODY MASS INDEX: 27.68 KG/M2 | SYSTOLIC BLOOD PRESSURE: 132 MMHG | HEIGHT: 60 IN

## 2023-02-28 DIAGNOSIS — Z67.91 RH NEGATIVE STATE IN ANTEPARTUM PERIOD: ICD-10-CM

## 2023-02-28 DIAGNOSIS — O26.899 RH NEGATIVE STATE IN ANTEPARTUM PERIOD: ICD-10-CM

## 2023-02-28 DIAGNOSIS — Z34.83 ENCOUNTER FOR SUPERVISION OF OTHER NORMAL PREGNANCY, THIRD TRIMESTER: Primary | ICD-10-CM

## 2023-02-28 DIAGNOSIS — Z3A.38 38 WEEKS GESTATION OF PREGNANCY: ICD-10-CM

## 2023-02-28 LAB
SL AMB  POCT GLUCOSE, UA: NEGATIVE
SL AMB POCT URINE PROTEIN: NEGATIVE

## 2023-02-28 NOTE — PROGRESS NOTES
21 y o   at 38w2d, here for routine OB visit  Feeling well overall and without concerns  Good FM  Denies LOF, VB, contractions  No questions/concerns  Problem   38 Weeks Gestation of Pregnancy    PN1 Labs: nml   - 28 Week Labs- mild anemia  FS done for 1 week and wnl   - flu/COVID/TDAP vaccines declined  - delivery consent signed  - needs birth plan signed  -GBS positive  -IOL declined for now           Problem List Items Addressed This Visit        Other    Rh negative state in antepartum period    38 weeks gestation of pregnancy     - Discussed plans for birth  Sofia notes that she would like to avoid IV in labor, does not want an epidural and declines pitocin  We discussed that she does need an IV in place, due to risk of hemorrhage  Discussed that epidural analgesia is and will remain her choice, that she may change her mind at any point as long as she can sit still in labor  We did did discuss the need for pitocin in labor  She is concerned because her sister had heart rate abnormalities in labor, had attributed this to pitocin  We discussed the mechanism of action of pitocin, that this does not make sense physiologically, whereas other factors in labor could impact maternal heart rate  Discussed potential need for pitocin for induction, if she does not labor spontaneously  Discussed that we would not recommend  over induction with pitocin in this situation, given risk/benefits here  Also discussed strong recommendation for pitocin after delivery, given this is the first line of uterotonics for the prevention of obstetric hemorrhage and maternal morbidity and mortality  Discussed other options available (hemabate, methergine), though these associated with potential side effects, therefore we recommend pitocin as first line  She agrees that this sounds reasonable, understands need for pitocin in labor    - in keeping with above, we discussed increased risk of stillbirth at 42 weeks   [id number] Pennie Rothman for expectant management of pregnancy at this time, but would recommend IOL prior to 42 weeks  She agrees/understands           Other Visit Diagnoses     Encounter for supervision of other normal pregnancy, third trimester    -  Primary    Relevant Orders    POCT urine dip (Completed)

## 2023-02-28 NOTE — PROGRESS NOTES
Pt is here for routine ob visit   No concerns at this time  Urine neg/neg  No LOF,VB,Contractions  +FM   UTD rhogam  Declined flu/tdap/covid vaccines  GBS +  Delivery consent signed at previous visit

## 2023-02-28 NOTE — ASSESSMENT & PLAN NOTE
- Discussed plans for birth  Sofia notes that she would like to avoid IV in labor, does not want an epidural and declines pitocin  We discussed that she does need an IV in place, due to risk of hemorrhage  Discussed that epidural analgesia is and will remain her choice, that she may change her mind at any point as long as she can sit still in labor  We did did discuss the need for pitocin in labor  She is concerned because her sister had heart rate abnormalities in labor, had attributed this to pitocin  We discussed the mechanism of action of pitocin, that this does not make sense physiologically, whereas other factors in labor could impact maternal heart rate  Discussed potential need for pitocin for induction, if she does not labor spontaneously  Discussed that we would not recommend  over induction with pitocin in this situation, given risk/benefits here  Also discussed strong recommendation for pitocin after delivery, given this is the first line of uterotonics for the prevention of obstetric hemorrhage and maternal morbidity and mortality  Discussed other options available (hemabate, methergine), though these associated with potential side effects, therefore we recommend pitocin as first line  She agrees that this sounds reasonable, understands need for pitocin in labor    - in keeping with above, we discussed increased risk of stillbirth at 42 weeks  [id number] Pennie Rothman for expectant management of pregnancy at this time, but would recommend IOL prior to 42 weeks  She agrees/understands

## 2023-03-08 ENCOUNTER — TELEPHONE (OUTPATIENT)
Dept: OBGYN CLINIC | Facility: CLINIC | Age: 18
End: 2023-03-08

## 2023-03-08 ENCOUNTER — ROUTINE PRENATAL (OUTPATIENT)
Dept: OBGYN CLINIC | Age: 18
End: 2023-03-08

## 2023-03-08 VITALS
DIASTOLIC BLOOD PRESSURE: 88 MMHG | WEIGHT: 143.4 LBS | SYSTOLIC BLOOD PRESSURE: 126 MMHG | BODY MASS INDEX: 28.16 KG/M2 | HEIGHT: 60 IN

## 2023-03-08 DIAGNOSIS — Z3A.39 39 WEEKS GESTATION OF PREGNANCY: Primary | ICD-10-CM

## 2023-03-08 LAB
SL AMB  POCT GLUCOSE, UA: NEGATIVE
SL AMB POCT URINE PROTEIN: NEGATIVE

## 2023-03-08 NOTE — PATIENT INSTRUCTIONS
Pregnancy at 44 to 40 Weeks   AMBULATORY CARE:   Changes happening with your body: You are now getting close to your due date  Your due date is just an estimate of when your baby will be born  Your baby may be born before or after your due date  Your breathing may be easier if your baby has moved down into a head-down position  You may need to urinate more often because the baby may be pressing on your bladder  You may also feel more discomfort and tire easily  You may also be having trouble sleeping  Seek care immediately if:   You develop a severe headache that does not go away  You have new or increased vision changes, such as blurred or spotted vision  You have new or increased swelling in your face or hands  You have vaginal spotting or bleeding  Your water broke or you feel warm water gushing or trickling from your vagina  Call your obstetrician if:   You have more than 5 contractions in 1 hour  You notice any changes in your baby's movements  You have abdominal cramps, pressure, or tightening  You have a change in vaginal discharge  You have chills or a fever  You have vaginal itching, burning, or pain  You have yellow, green, white, or foul-smelling vaginal discharge  You have pain or burning when you urinate, less urine than usual, or pink or bloody urine  You have questions or concerns about your condition or care  How to care for yourself at this stage of your pregnancy:       Eat a variety of healthy foods  Healthy foods include fruits, vegetables, whole-grain breads, low-fat dairy foods, beans, lean meats, and fish  Drink liquids as directed  Ask how much liquid to drink each day and which liquids are best for you  Limit caffeine to less than 200 milligrams each day  Limit your intake of fish to 2 servings each week  Choose fish low in mercury such as canned light tuna, shrimp, salmon, cod, or tilapia   Do not  eat fish high in mercury such as swordfish, tilefish, von mackerel, and shark  Take prenatal vitamins as directed  Your need for certain vitamins and minerals, such as folic acid, increases during pregnancy  Prenatal vitamins provide some of the extra vitamins and minerals you need  Prenatal vitamins may also help to decrease the risk of certain birth defects  Rest as needed  Put your feet up if you have swelling in your ankles and feet  Talk to your healthcare provider about exercise  Moderate exercise can help you stay fit  Your healthcare provider will help you plan an exercise program that is safe for you during pregnancy  Do not smoke  Smoking increases your risk of a miscarriage and other health problems during your pregnancy  Smoking can cause your baby to be born early or weigh less at birth  Ask your healthcare provider for information if you need help quitting  Do not drink alcohol  Alcohol passes from your body to your baby through the placenta  It can affect your baby's brain development and cause fetal alcohol syndrome (FAS)  FAS is a group of conditions that causes mental, behavior, and growth problems  Talk to your healthcare provider before you take any medicines  Many medicines may harm your baby if you take them when you are pregnant  Do not take any medicines, vitamins, herbs, or supplements without first talking to your healthcare provider  Never use illegal or street drugs (such as marijuana or cocaine) while you are pregnant  Safety tips during pregnancy:   Avoid hot tubs and saunas  Do not use a hot tub or sauna while you are pregnant, especially during your first trimester  Hot tubs and saunas may raise your baby's temperature and increase the risk of birth defects  Avoid toxoplasmosis  This is an infection caused by eating raw meat or being around infected cat feces  It can cause birth defects, miscarriages, and other problems  Wash your hands after you touch raw meat   Make sure any meat is well-cooked before you eat it  Avoid raw eggs and unpasteurized milk  Use gloves or ask someone else to clean your cat's litter box while you are pregnant  Ask your healthcare provider about travel  The most comfortable time to travel is during the second trimester  Ask your provider if you can travel after 36 weeks  You may not be able to travel in an airplane after 36 weeks  He or she may also recommend that you avoid long road trips  Changes happening with your baby: Your baby is ready to be born  At birth, your baby may weigh about 6 to 9 pounds and be about 19 to 21 inches long  Your baby may be in a head-down position  Your baby will also rest lower in your abdomen  What you need to know about prenatal care: Your healthcare provider will check your blood pressure and weight  You may also need the following:  A urine test  may also be done to check for sugar and protein  These can be signs of gestational diabetes or infection  Protein in your urine may also be a sign of preeclampsia  Preeclampsia is a condition that can develop during week 20 or later of your pregnancy  It causes high blood pressure, and it can cause problems with your kidneys and other organs  A gestational diabetes screen  may be done  Your healthcare provider may order either a 1-step or 2-step oral glucose tolerance test (OGTT)  1-step OGTT:  Your blood sugar level will be tested after you have not eaten for 8 hours (fasting)  You will then be given a glucose drink  Your level will be tested again 1 hour and 2 hours after you finish the drink  2-step OGTT:  You do not have to fast for the first part of the test  You will have the glucose drink at any time of day  Your blood sugar level will be checked 1 hour later  If your blood sugar is higher than a certain level, another test will be ordered  You will fast and your blood sugar level will be tested  You will have the glucose drink   Your blood will be tested again 1 hour, 2 hours, and 3 hours after you finish the glucose drink  Your baby's heart rate  will be checked  Follow up with your obstetrician as directed:  Write down your questions so you remember to ask them during your visits  © Copyright [employer] 2022 Information is for End User's use only and may not be sold, redistributed or otherwise used for commercial purposes  The above information is an  only  It is not intended as medical advice for individual conditions or treatments  Talk to your doctor, nurse or pharmacist before following any medical regimen to see if it is safe and effective for you  Induction of Labor   WHAT YOU NEED TO KNOW:   What is induction of labor? Induction of labor is a procedure to induce (start) your labor before it begins on its own  Medicines and other methods are used to start contractions and help your cervix soften, thin (efface), and dilate (open)  You may be given antibiotics to fight a bacterial infection you have or prevent an infection during delivery  Why might I need induction of labor? A health problem you have, such as high blood pressure or diabetes    A health problem your baby has, such as a slow heartbeat or poor growth inside the womb     Problems related to your pregnancy, such as infection of the amniotic fluid, your water breaks before labor begins, or you have too little amniotic fluid    What happens during induction of labor? Your healthcare provider may use one or more of the following to induce labor:  Cervical ripening  is a process that helps to soften and thin out your cervix  Medicines called prostaglandins may be used to ripen your cervix  These medicines can be inserted into your vagina or taken as a pill  Other methods can also be used to dilate the cervix  This includes a catheter with an inflatable balloon on the end that is inserted into your cervix   Saline injected through the catheter helps the balloon to expand  A substance that absorbs water may also be inserted into your cervix to help dilate it  Stripping the membranes  is a procedure that causes your body to release prostaglandins naturally  Prostaglandins soften the cervix and may help to cause contractions  Your healthcare provider will sweep a gloved finger over the membranes that connect the amniotic sac to the uterus wall  Rupturing the amniotic sac  is a procedure that is used to cause your water to break  Your healthcare provider will use a small tool to make a hole in your amniotic sac  This may help contractions to start  Oxytocin  may be given through an IV to cause contractions to start and stay strong and regular  What are the risks of induction of labor? Medicines used to induce labor may cause too many contractions  This can lower your baby's heartbeat and decrease his or her oxygen supply  Induction of labor also increases the risk of umbilical cord prolapse  This condition causes the umbilical cord to slip back into the vagina before delivery  It can compress the cord and decrease your baby's oxygen supply  Medical induction may cause an infection in you or your baby  Medical induction may also increase your risk for a  section (), especially if it is the first time you give birth  Your uterus may rupture if you have had a  before  CARE AGREEMENT:   You have the right to help plan your care  Learn about your health condition and how it may be treated  Discuss treatment options with your healthcare providers to decide what care you want to receive  You always have the right to refuse treatment  The above information is an  only  It is not intended as medical advice for individual conditions or treatments  Talk to your doctor, nurse or pharmacist before following any medical regimen to see if it is safe and effective for you    ©  [employer]  Information is for End User's use only and may not be sold, redistributed or otherwise used for commercial purposes  Having Your Baby: The Labor Process   AMBULATORY CARE:   The labor process  is a series of 3 stages that your body goes through to deliver your baby  It is not known for sure what causes labor to begin  Hormones made by you and your baby and changes in your uterus may help labor to start  Labor usually starts 2 weeks before or after your due date  Most women do not have their baby exactly on their due date  Call your obstetrician if:   You have vaginal spotting or bleeding  Your water broke or you feel warm water gushing or trickling from your vagina  You have more than 5 contractions in 1 hour  You have bloody mucus or show  You notice any changes in your baby's movements  You have abdominal cramps, pressure, or tightening  You have a change in vaginal discharge  You have questions or concerns about your condition or care  First stage of labor: The first stage of labor includes latent (early) labor and active labor  This stage may last up to 12 hours if this is your first pregnancy  It may last up to 10 hours if you delivered a baby before  Your uterus will contract to prepare your cervix for delivery and to push your baby out of the birth canal  Your cervix will dilate (widen) and efface (soften and become thinner)  Your contractions may last from 30 to 60 seconds  The contractions usually start in the back and move to the front  You may also have a pink, clear, or slightly bloody discharge called bloody show  Bloody show is caused by the movement of a mucus plug from your cervix  During pregnancy the mucus plug blocks your cervix to prevent it from opening  What to do during early labor:  Early labor may last for several hours  You will most likely be at home during early labor  Rest as much as possible while you are at home  Have someone rub your back   It may be helpful to place ice packs on your lower back  Go for a short walk if you are able  Drink water and suck on ice chips  Ask your healthcare provider if it is okay to eat during early labor  How to know when you are in active labor: This stage may last up to 12 hours if this is your first pregnancy  It may last up to 10 hours if you delivered a baby before  Your contractions will get stronger, last longer, and happen more frequently  They will also become more intense and painful  Time your contractions from the beginning of one to the beginning of the next  Write this information down for 1 hour  Your healthcare provider will tell you when to go to the hospital or birthing center  This will be based on how many minutes apart your contractions are  Second stage of labor:  The second stage is the time between full cervix dilation and the birth of your baby  Your cervix will be completely dilated to 10 centimeters and your baby will be ready to be born  The second stage usually lasts 20 minutes to 2 hours  It may last up to 3 hours if this is your first baby  You may be given antibiotics to fight a bacterial infection you have or prevent an infection during delivery  Healthcare providers will help you find a position for giving birth that is comfortable for you  You can lie on your back, have your feet up in stirrups, or squat  You may feel pressure on your rectum and the urge to push  This pressure is caused by the movement of your baby's head down the birth canal  Your healthcare provider will have you push when you feel the urge  He or she will guide your baby out of the birth canal  Forceps or suction may be used to help deliver your baby  You may also need an episiotomy (incision) to make the vaginal opening larger  This will make more room for your baby  Your perineum will be protected during delivery  This may be with a warm compress or massage of the area      At least 1 minute after your baby is born, your healthcare provider will put clamps on the umbilical cord  The cord will then be cut  Your baby may be placed on your chest right away  He or she may also start breastfeeding  Third stage of labor:  The placenta (afterbirth) is delivered during this stage  After you give birth, your uterus will continue to contract to help push out the placenta  These contractions will begin 5 to 30 minutes after you give birth  Your healthcare provider will tell you when to push  You may have chills or shakiness during this stage  You may be given medicine to help prevent heavy bleeding that can happen during this stage  How to manage labor pain:  Pain can be managed naturally or with medicines  You can naturally manage pain by using relaxation methods and controlled breathing  There are different types of medicines that can be used to relieve pain while you are in labor  These medicines may be given through an IV or an epidural (thin catheter in your lower back)  Talk with your healthcare about your options for pain medicines if you choose to use them  Tell your provider if you prefer not to have any pain control medicines during labor  © Copyright [employer] 2022 Information is for End User's use only and may not be sold, redistributed or otherwise used for commercial purposes  The above information is an  only  It is not intended as medical advice for individual conditions or treatments  Talk to your doctor, nurse or pharmacist before following any medical regimen to see if it is safe and effective for you

## 2023-03-08 NOTE — PROGRESS NOTES
Patient is here for prenatal ob visit today  No question's or concerns at this time  GA: 39w3d  Estimated Date of Delivery: 3/12/23    Urine: Protein Negative / Glucose Negative  Denies loss of fluid, vaginal bleeding and contractions  Good fetal movement

## 2023-03-08 NOTE — ASSESSMENT & PLAN NOTE
Al Callejas is a 21 y o    39w3d who presents for routine PNV  Prepregnancy BMI 19 33 with a goal weight gain 11 5 kg (25 lb)-16 kg (35 lb)  TWG 20 1 kg (44 lb 6 4 oz)    Denies OB complaints  Good fetal movement  Denies CTX/LOF/VB  Discussed recommended 41 week IOL and increased risk of stillbirth after 42 weeks  Agrees to 41 week IOL  Consent reviewed and signed  Reviewed early signs of labor and FKCs  RTO in 1 week

## 2023-03-08 NOTE — TELEPHONE ENCOUNTER
----- Message from Pastora Alegria, [address] sent at 3/8/2023 11:52 AM EST -----  Regarding: IOL  Procedure to be scheduled: IOL   DONTAE: 3/12/23  Cervical dialtion: 0/30/-3  Indication for delivery: 41 week IOL  Requested date (s) of delivery:  3/19 and onwards   If requested date is unavailable, is there a date by which the pt must be delivered?  3/26  Physician preference: none       If IOL, anticipated method: cytotec/blankenship

## 2023-03-08 NOTE — PROGRESS NOTES
Problem   39 Weeks Gestation of Pregnancy    PN1 Labs: nml   - 28 Week Labs- mild anemia  FS done for 1 week and wnl   - flu/COVID/TDAP vaccines declined  - delivery consent signed  - needs birth plan signed  -GBS positive  -IOL declined for now         39 weeks gestation of pregnancy  Margarita Velazco is a 21 y o    39w3d who presents for routine PNV  Prepregnancy BMI 19 33 with a goal weight gain 11 5 kg (25 lb)-16 kg (35 lb)  TWG 20 1 kg (44 lb 6 4 oz)    Denies OB complaints  Good fetal movement  Denies CTX/LOF/VB  Discussed recommended 41 week IOL and increased risk of stillbirth after 42 weeks  Agrees to 41 week IOL  Consent reviewed and signed  Reviewed early signs of labor and FKCs  RTO in 1 week

## 2023-03-10 NOTE — TELEPHONE ENCOUNTER
3/19 7 pm scheduled  Patient is aware  Patient notified of IOL date,time,and location  Advised patient she may eat a light dinner prior to going to L&D  In the interim please report any vaginal bleeding,leakage of fluid,decreased fetal movement or contractions  Reviewed fetal kick counts  Advised to keep all upcoming prenatal visits  Patient verbalizes understanding  Routed to on call providers as [email or web address]

## 2023-03-14 ENCOUNTER — ROUTINE PRENATAL (OUTPATIENT)
Dept: OBGYN CLINIC | Age: 18
End: 2023-03-14

## 2023-03-14 VITALS
WEIGHT: 143 LBS | HEART RATE: 83 BPM | SYSTOLIC BLOOD PRESSURE: 116 MMHG | BODY MASS INDEX: 27.93 KG/M2 | DIASTOLIC BLOOD PRESSURE: 78 MMHG

## 2023-03-14 DIAGNOSIS — Z3A.40 40 WEEKS GESTATION OF PREGNANCY: ICD-10-CM

## 2023-03-14 DIAGNOSIS — O26.899 RH NEGATIVE STATE IN ANTEPARTUM PERIOD: ICD-10-CM

## 2023-03-14 DIAGNOSIS — Z33.1 ADOLESCENT PREGNANCY, INCIDENTAL: ICD-10-CM

## 2023-03-14 DIAGNOSIS — B95.1 POSITIVE GBS TEST: ICD-10-CM

## 2023-03-14 DIAGNOSIS — O99.013 ANEMIA AFFECTING PREGNANCY IN THIRD TRIMESTER: ICD-10-CM

## 2023-03-14 DIAGNOSIS — Z67.91 RH NEGATIVE STATE IN ANTEPARTUM PERIOD: ICD-10-CM

## 2023-03-14 DIAGNOSIS — Z34.93 PRENATAL CARE IN THIRD TRIMESTER: Primary | ICD-10-CM

## 2023-03-14 LAB
SL AMB  POCT GLUCOSE, UA: NORMAL
SL AMB POCT URINE PROTEIN: NORMAL

## 2023-03-14 NOTE — PROGRESS NOTES
Problem   40 Weeks Gestation of Pregnancy    PN1 Labs: nml   - 28 Week Labs- mild anemia  FS done for 1 week and wnl   - flu/COVID/TDAP vaccines declined  - delivery consent signed  -GBS positive  -IOL scheduled for 3/19 at 7pm         40 weeks gestation of pregnancy  She feels well  She denies LOF/CTX/VB  She voiced no concerns  Discussed fetal kick counting  She was encouraged to continue with her perineal/vaginal massages to prevent lacerations during the labor process

## 2023-03-14 NOTE — PATIENT INSTRUCTIONS
Pregnancy at 44 to 40 Weeks   AMBULATORY CARE:   Changes happening with your body: You are now getting close to your due date  Your due date is just an estimate of when your baby will be born  Your baby may be born before or after your due date  Your breathing may be easier if your baby has moved down into a head-down position  You may need to urinate more often because the baby may be pressing on your bladder  You may also feel more discomfort and tire easily  You may also be having trouble sleeping  Seek care immediately if:   You develop a severe headache that does not go away  You have new or increased vision changes, such as blurred or spotted vision  You have new or increased swelling in your face or hands  You have vaginal spotting or bleeding  Your water broke or you feel warm water gushing or trickling from your vagina  Call your obstetrician if:   You have more than 5 contractions in 1 hour  You notice any changes in your baby's movements  You have abdominal cramps, pressure, or tightening  You have a change in vaginal discharge  You have chills or a fever  You have vaginal itching, burning, or pain  You have yellow, green, white, or foul-smelling vaginal discharge  You have pain or burning when you urinate, less urine than usual, or pink or bloody urine  You have questions or concerns about your condition or care  How to care for yourself at this stage of your pregnancy:       Eat a variety of healthy foods  Healthy foods include fruits, vegetables, whole-grain breads, low-fat dairy foods, beans, lean meats, and fish  Drink liquids as directed  Ask how much liquid to drink each day and which liquids are best for you  Limit caffeine to less than 200 milligrams each day  Limit your intake of fish to 2 servings each week  Choose fish low in mercury such as canned light tuna, shrimp, salmon, cod, or tilapia   Do not  eat fish high in mercury such as swordfish, tilefish, von mackerel, and shark  Take prenatal vitamins as directed  Your need for certain vitamins and minerals, such as folic acid, increases during pregnancy  Prenatal vitamins provide some of the extra vitamins and minerals you need  Prenatal vitamins may also help to decrease the risk of certain birth defects  Rest as needed  Put your feet up if you have swelling in your ankles and feet  Talk to your healthcare provider about exercise  Moderate exercise can help you stay fit  Your healthcare provider will help you plan an exercise program that is safe for you during pregnancy  Do not smoke  Smoking increases your risk of a miscarriage and other health problems during your pregnancy  Smoking can cause your baby to be born early or weigh less at birth  Ask your healthcare provider for information if you need help quitting  Do not drink alcohol  Alcohol passes from your body to your baby through the placenta  It can affect your baby's brain development and cause fetal alcohol syndrome (FAS)  FAS is a group of conditions that causes mental, behavior, and growth problems  Talk to your healthcare provider before you take any medicines  Many medicines may harm your baby if you take them when you are pregnant  Do not take any medicines, vitamins, herbs, or supplements without first talking to your healthcare provider  Never use illegal or street drugs (such as marijuana or cocaine) while you are pregnant  Safety tips during pregnancy:   Avoid hot tubs and saunas  Do not use a hot tub or sauna while you are pregnant, especially during your first trimester  Hot tubs and saunas may raise your baby's temperature and increase the risk of birth defects  Avoid toxoplasmosis  This is an infection caused by eating raw meat or being around infected cat feces  It can cause birth defects, miscarriages, and other problems  Wash your hands after you touch raw meat   Make sure any meat is well-cooked before you eat it  Avoid raw eggs and unpasteurized milk  Use gloves or ask someone else to clean your cat's litter box while you are pregnant  Ask your healthcare provider about travel  The most comfortable time to travel is during the second trimester  Ask your provider if you can travel after 36 weeks  You may not be able to travel in an airplane after 36 weeks  He or she may also recommend that you avoid long road trips  Changes happening with your baby: Your baby is ready to be born  At birth, your baby may weigh about 6 to 9 pounds and be about 19 to 21 inches long  Your baby may be in a head-down position  Your baby will also rest lower in your abdomen  What you need to know about prenatal care: Your healthcare provider will check your blood pressure and weight  You may also need the following:  A urine test  may also be done to check for sugar and protein  These can be signs of gestational diabetes or infection  Protein in your urine may also be a sign of preeclampsia  Preeclampsia is a condition that can develop during week 20 or later of your pregnancy  It causes high blood pressure, and it can cause problems with your kidneys and other organs  A gestational diabetes screen  may be done  Your healthcare provider may order either a 1-step or 2-step oral glucose tolerance test (OGTT)  1-step OGTT:  Your blood sugar level will be tested after you have not eaten for 8 hours (fasting)  You will then be given a glucose drink  Your level will be tested again 1 hour and 2 hours after you finish the drink  2-step OGTT:  You do not have to fast for the first part of the test  You will have the glucose drink at any time of day  Your blood sugar level will be checked 1 hour later  If your blood sugar is higher than a certain level, another test will be ordered  You will fast and your blood sugar level will be tested  You will have the glucose drink   Your blood will be tested again 1 hour, 2 hours, and 3 hours after you finish the glucose drink  Your baby's heart rate  will be checked  Follow up with your obstetrician as directed:  Write down your questions so you remember to ask them during your visits  © Copyright Hil[employer] López 2022 Information is for End User's use only and may not be sold, redistributed or otherwise used for commercial purposes  The above information is an  only  It is not intended as medical advice for individual conditions or treatments  Talk to your doctor, nurse or pharmacist before following any medical regimen to see if it is safe and effective for you

## 2023-03-17 ENCOUNTER — TELEPHONE (OUTPATIENT)
Dept: OBGYN CLINIC | Facility: CLINIC | Age: 18
End: 2023-03-17

## 2023-03-17 NOTE — TELEPHONE ENCOUNTER
----- Message from 20 Valentina Lynn sent at 3/17/2023  1:43 PM EDT -----  Regarding: Labor and delivery   Contact: [phone]  Hello, I am feeling a lot of pressure but I still haven’t had consistent contractions  But I was wondering if there was any possibility of going in to see how dilated I am now  Roselyn Finley been trying everything to induce my labor naturally, bouncing on the ball, pumping, walking ALOT, and the past 2 days just getting more pressure but still no contractions

## 2023-03-19 ENCOUNTER — HOSPITAL ENCOUNTER (OUTPATIENT)
Dept: LABOR AND DELIVERY | Facility: HOSPITAL | Age: 18
Discharge: HOME | End: 2023-03-19

## 2023-03-19 ENCOUNTER — HOSPITAL ENCOUNTER (INPATIENT)
Facility: HOSPITAL | Age: 18
LOS: 3 days | Discharge: HOME | End: 2023-03-22
Attending: STUDENT IN AN ORGANIZED HEALTH CARE EDUCATION/TRAINING PROGRAM | Admitting: STUDENT IN AN ORGANIZED HEALTH CARE EDUCATION/TRAINING PROGRAM

## 2023-03-19 PROBLEM — Z3A.41 41 WEEKS GESTATION OF PREGNANCY: Status: ACTIVE | Noted: 2022-08-29

## 2023-03-19 PROBLEM — O48.0 41 WEEKS GESTATION OF PREGNANCY: Status: ACTIVE | Noted: 2022-08-29

## 2023-03-19 LAB
ABO GROUP BLD: NORMAL
ALBUMIN SERPL BCP-MCNC: 3.3 G/DL (ref 3.5–5)
ALP SERPL-CCNC: 357 U/L (ref 34–104)
ALT SERPL W P-5'-P-CCNC: 10 U/L (ref 7–52)
AMPHETAMINES SERPL QL SCN: NEGATIVE
ANION GAP SERPL CALCULATED.3IONS-SCNC: 11 MMOL/L (ref 4–13)
AST SERPL W P-5'-P-CCNC: 23 U/L (ref 13–39)
BARBITURATES UR QL: NEGATIVE
BENZODIAZ UR QL: NEGATIVE
BILIRUB SERPL-MCNC: 0.26 MG/DL (ref 0.2–1)
BLD GP AB SCN SERPL QL: NEGATIVE
BUN SERPL-MCNC: 8 MG/DL (ref 5–25)
CALCIUM ALBUM COR SERPL-MCNC: 9.5 MG/DL (ref 8.3–10.1)
CALCIUM SERPL-MCNC: 8.9 MG/DL (ref 8.4–10.2)
CHLORIDE SERPL-SCNC: 105 MMOL/L (ref 96–108)
CO2 SERPL-SCNC: 19 MMOL/L (ref 21–32)
COCAINE UR QL: NEGATIVE
CREAT SERPL-MCNC: 0.51 MG/DL (ref 0.6–1.3)
CREAT UR-MCNC: 21.6 MG/DL
ERYTHROCYTE [DISTWIDTH] IN BLOOD BY AUTOMATED COUNT: 15.9 % (ref 11.6–15.1)
GFR SERPL CREATININE-BSD FRML MDRD: 139 ML/MIN/1.73SQ M
GLUCOSE SERPL-MCNC: 103 MG/DL (ref 65–140)
HCT VFR BLD AUTO: 35.5 % (ref 34.8–46.1)
HGB BLD-MCNC: 12 G/DL (ref 11.5–15.4)
MCH RBC QN AUTO: 30.9 PG (ref 26.8–34.3)
MCHC RBC AUTO-ENTMCNC: 33.8 G/DL (ref 31.4–37.4)
MCV RBC AUTO: 92 FL (ref 82–98)
METHADONE UR QL: NEGATIVE
OPIATES UR QL SCN: NEGATIVE
OXYCODONE+OXYMORPHONE UR QL SCN: NEGATIVE
PCP UR QL: NEGATIVE
PLATELET # BLD AUTO: 244 THOUSANDS/UL (ref 149–390)
PMV BLD AUTO: 11.2 FL (ref 8.9–12.7)
POTASSIUM SERPL-SCNC: 3.9 MMOL/L (ref 3.5–5.3)
PROT SERPL-MCNC: 6.7 G/DL (ref 6.4–8.4)
PROT UR-MCNC: 34 MG/DL
PROT/CREAT UR: 1.57 MG/G{CREAT} (ref 0–0.1)
RBC # BLD AUTO: 3.88 MILLION/UL (ref 3.81–5.12)
RH BLD: NEGATIVE
SODIUM SERPL-SCNC: 135 MMOL/L (ref 135–147)
SPECIMEN EXPIRATION DATE: NORMAL
THC UR QL: NEGATIVE
WBC # BLD AUTO: 10.96 THOUSAND/UL (ref 4.31–10.16)

## 2023-03-19 RX ORDER — SODIUM CHLORIDE, SODIUM LACTATE, POTASSIUM CHLORIDE, CALCIUM CHLORIDE 600; 310; 30; 20 MG/100ML; MG/100ML; MG/100ML; MG/100ML
125 INJECTION, SOLUTION INTRAVENOUS CONTINUOUS
Status: DISCONTINUED | OUTPATIENT
Start: 2023-03-19 | End: 2023-03-20

## 2023-03-19 RX ORDER — BUPIVACAINE HYDROCHLORIDE 2.5 MG/ML
30 INJECTION, SOLUTION EPIDURAL; INFILTRATION; INTRACAUDAL ONCE AS NEEDED
Status: DISCONTINUED | OUTPATIENT
Start: 2023-03-19 | End: 2023-03-20

## 2023-03-19 RX ORDER — ONDANSETRON 2 MG/ML
4 INJECTION INTRAMUSCULAR; INTRAVENOUS EVERY 6 HOURS PRN
Status: DISCONTINUED | OUTPATIENT
Start: 2023-03-19 | End: 2023-03-20

## 2023-03-19 RX ADMIN — SODIUM CHLORIDE, SODIUM LACTATE, POTASSIUM CHLORIDE, AND CALCIUM CHLORIDE 125 ML/HR: .6; .31; .03; .02 INJECTION, SOLUTION INTRAVENOUS at 19:45

## 2023-03-19 RX ADMIN — SODIUM CHLORIDE 5 MILLION UNITS: 0.9 INJECTION, SOLUTION INTRAVENOUS at 19:45

## 2023-03-19 NOTE — H&P
"H&P Exam - Obstetrics   Negrito Becerra 21 y o  female MRN: [id number]  Unit/Bed#: LD TRIAGE 3-01 Encounter: [id number]      ASSESSMENT:  21 y  o yo  at 41w0d weeks gestation who is being admitted for eIOL  EFW: 8lbs  VTX by transabdominal ultrasound TAUS    PLAN:    Positive GBS test  Assessment & Plan  PCN for GBS ppx    Rh negative state in antepartum period  Assessment & Plan  Rhogam postpartum    * 41 weeks gestation of pregnancy  Assessment & Plan  Admit to L&D  CBC, RPR, Type & Screen  SVE: 1/50/-3  FHT: category I  Clinical EFW: 8lbs ; Vertex confirmed by TAUS  GBS status: positive, plan for PCN   Postpartum contraception plan: declines  Analgesia at maternal request  Plan: FB and Pit    Discussed with Dr Jose Luis Finnegan      This patient will be an INPATIENT  and I certify the anticipated length of stay is >2 Midnights  History of Present Illness     Chief Complaint: \"I think I broke my water\"    HPI:  Negrito Becerra is a 21 y o   female with an DONTAE of 3/12/2023, by Ultrasound at 41w0d weeks gestation who is being admitted for eIOL  Initially presented for concern for leakage of fluid  She states that this happened around 6 AM   She is feeling irregular contractions, vaginal spotting, but denies decreased fetal movement  Pregnancy is complicated by Rh+ status, GBS positive status, anemia and teen pregnancy  She is [facility] patient  PREGNANCY COMPLICATIONS:   1) Pregnancy at 41w0d  2) Rh+ status  3) GBS positive  4) Anemia  5) Teen pregnancy    OB History    Para Term  AB Living   1             SAB IAB Ectopic Multiple Live Births                  # Outcome Date GA Lbr Jovan/2nd Weight Sex Delivery Anes PTL Lv   1 Current                Baby complications/comments: none    Review of Systems   Constitutional: Negative for chills and fever  HENT: Negative for ear pain and sore throat  Eyes: Negative for pain and visual disturbance     Respiratory: Negative for cough and " shortness of breath  Cardiovascular: Negative for chest pain and palpitations  Gastrointestinal: Negative for abdominal pain and vomiting  Genitourinary: Positive for vaginal discharge  Negative for dysuria and hematuria  Musculoskeletal: Negative for arthralgias and back pain  Skin: Negative for color change and rash  Neurological: Negative for seizures and syncope  All other systems reviewed and are negative  Historical Information   Past Medical History:   Diagnosis Date   • Varicella     vaccine     History reviewed  No pertinent surgical history  Social History   Social History     Substance and Sexual Activity   Alcohol Use Never     Social History     Substance and Sexual Activity   Drug Use Not Currently   • Types: Marijuana    Comment: last used 04/2022     Social History     Tobacco Use   Smoking Status Never   Smokeless Tobacco Never     Family History: non-contributory    Meds/Allergies      Medications Prior to Admission   Medication   • Prenatal Vit w/Bi-Flyxtmetu-TA (PNV PO)      No Known Allergies    OBJECTIVE:    Vitals: Blood pressure 139/92, pulse 96, temperature 98 3 °F (36 8 °C), temperature source Oral, resp  rate 16, height 5' (1 524 m), weight 64 9 kg (143 lb), last menstrual period 06/04/2022, not currently breastfeeding  Body mass index is 27 93 kg/m²  Physical Exam  Vitals reviewed  Constitutional:       Appearance: Normal appearance  HENT:      Head: Normocephalic and atraumatic  Eyes:      Extraocular Movements: Extraocular movements intact  Cardiovascular:      Rate and Rhythm: Normal rate  Pulses: Normal pulses  Pulmonary:      Effort: Pulmonary effort is normal       Breath sounds: Normal breath sounds  Abdominal:      Palpations: Abdomen is soft  Comments: Gravid uterus   Musculoskeletal:         General: Normal range of motion  Cervical back: Normal range of motion  Skin:     General: Skin is warm and dry     Neurological: Mental Status: She is alert  Psychiatric:         Mood and Affect: Mood normal          Behavior: Behavior normal        [place]: negative  Nitrazine: negative    Cervix:  Cervical Dilation: 1  Cervical Effacement: 50  Cervical Consistency: Medium  Fetal Station: -3  Presentation: Vertex  Method: Manual  OB Examiner: Sparkle      Fetal heart rate:    category I    [place]:    q5mins    GBS: positive    Prenatal Labs: I have personally reviewed pertinent reports    , Blood Type:   Lab Results   Component Value Date/Time    ABO Grouping O 01/05/2023 11:35 AM     , D (Rh type):   Lab Results   Component Value Date/Time    Rh Factor Negative 01/05/2023 11:35 AM     , Antibody Screen: No results found for: ANTIBODYSCR , HCT/HGB:   Lab Results   Component Value Date/Time    Hematocrit 33 5 (L) 01/31/2023 10:19 AM    Hemoglobin 10 8 (L) 01/31/2023 10:19 AM      , MCV:   Lab Results   Component Value Date/Time    MCV 93 01/31/2023 10:19 AM      , Platelets:   Lab Results   Component Value Date/Time    Platelets 596 07/14/8256 10:19 AM      , 1 hour Glucola: No results found for: [id number], 3 hour GTT: No results found for: GCPWFYA7HT, Varicella: No results found for: VARICELLAIGG    , Rubella: No results found for: RUBELLAIGGQT     , VDRL/RPR:   Lab Results   Component Value Date/Time    RPR Non-Reactive 12/19/2022 02:00 PM      , Urine Culture/Screen: No results found for: URINECX    , Urine Drug Screen: No results found for: AMPHETUR, BARBTUR, BDZUR, THCUR, COCAINEUR, METHADONEUR, OPIATEUR, PCPUR, MTHAMUR, ECSTASYUR, TRICYCLICSUR, Hep B:   Lab Results   Component Value Date/Time    Hepatitis B Surface Ag negative 09/01/2022 12:00 AM     , Hep C: No components found for: HEPCSAG, EXTHEPCSAG   , HIV:   Lab Results   Component Value Date/Time    HIV-1/HIV-2 Ab Non-Reactive 02/24/2022 11:23 AM    HIV-1/HIV-2 AB Non-Reactive 09/01/2022 12:00 AM     , Chlamydia: No results found for: EXTCHLAMYDIA  , Gonorrhea:   Lab Results   Component Value Date/Time    N gonorrhoeae, DNA Probe Negative 08/29/2022 04:46 PM     , Group B Strep:    Lab Results   Component Value Date/Time    Strep Grp B PCR Positive (A) 02/15/2023 10:42 AM          Invasive Devices     Peripheral Intravenous Line  Duration           Peripheral IV 03/19/23 Left Forearm <1 day              Whitney Yuen MD  OBGYN PGY-2  3/19/2023  4:42 PM

## 2023-03-19 NOTE — ASSESSMENT & PLAN NOTE
Admit to L&D  CBC, RPR, Type & Screen  SVE: 1/50/-3  FHT: category I  Clinical EFW: 8lbs ;  Vertex confirmed by TAUS  GBS status: positive, plan for PCN   Postpartum contraception plan: declines  Analgesia at maternal request  Plan: FB and Pit

## 2023-03-19 NOTE — PLAN OF CARE
Problem: PAIN - ADULT  Goal: Verbalizes/displays adequate comfort level or baseline comfort level  Description: Interventions:  - Encourage patient to monitor pain and request assistance  - Assess pain using appropriate pain scale  - Administer analgesics based on type and severity of pain and evaluate response  - Implement non-pharmacological measures as appropriate and evaluate response  - Consider cultural and social influences on pain and pain management  - Notify physician/advanced practitioner if interventions unsuccessful or patient reports new pain  3/19/2023 1645 by Nadine Figueroa RN  Outcome: Progressing  3/19/2023 1645 by Nadine Figueroa RN  Outcome: Progressing     Problem: INFECTION - ADULT  Goal: Absence or prevention of progression during hospitalization  Description: INTERVENTIONS:  - Assess and monitor for signs and symptoms of infection  - Monitor lab/diagnostic results  - Monitor all insertion sites, i e  indwelling lines, tubes, and drains  - Monitor endotracheal if appropriate and nasal secretions for changes in amount and color  - [place] appropriate cooling/warming therapies per order  - Administer medications as ordered  - Instruct and encourage patient and family to use good hand hygiene technique  - Identify and instruct in appropriate isolation precautions for identified infection/condition  3/19/2023 1645 by Nadine Figueroa RN  Outcome: Progressing  3/19/2023 [place]  81  by Nadine Figueroa RN  Outcome: Progressing  Goal: Absence of fever/infection during neutropenic period  Description: INTERVENTIONS:  - Monitor WBC    3/19/2023 1645 by Nadine Figueroa RN  Outcome: Progressing  3/19/2023 [place]  81  by Nadine Figueroa RN  Outcome: Progressing     Problem: SAFETY ADULT  Goal: Patient will remain free of falls  Description: INTERVENTIONS:  - Educate patient/family on patient safety including physical limitations  - Instruct patient to call for assistance with activity   - Consult OT/PT to assist with strengthening/mobility   - Keep Call bell within reach  - Keep bed low and locked with side rails adjusted as appropriate  - Keep care items and personal belongings within reach  - Initiate and maintain comfort rounds  - Apply yellow socks and bracelet for high fall risk patients  - Consider moving patient to room near nurses station  3/19/2023 1645 by Camden Nash RN  Outcome: Progressing  3/19/2023 [place]  81  by Camden Nash RN  Outcome: Progressing  Goal: Maintain or return to baseline ADL function  Description: INTERVENTIONS:  -  Assess patient's ability to carry out ADLs; assess patient's baseline for ADL function and identify physical deficits which impact ability to perform ADLs (bathing, care of mouth/teeth, toileting, grooming, dressing, etc )  - Assess/evaluate cause of self-care deficits   - Assess range of motion  - Assess patient's mobility; develop plan if impaired  - Assess patient's need for assistive devices and provide as appropriate  - Encourage maximum independence but intervene and supervise when necessary  - Involve family in performance of ADLs  - Assess for home care needs following discharge   - Consider OT consult to assist with ADL evaluation and planning for discharge  - Provide patient education as appropriate  3/19/2023 1645 by Camden Nash RN  Outcome: Progressing  3/19/2023 [place]  81  by Camden Nash RN  Outcome: Progressing  Goal: Maintains/Returns to pre admission functional level  Description: INTERVENTIONS:  - Perform BMAT or MOVE assessment daily    - Set and communicate daily mobility goal to care team and patient/family/caregiver     - Out of bed for toileting  - Record patient progress and toleration of activity level   3/19/2023 1645 by Camden Nash RN  Outcome: Progressing  3/19/2023 1645 by Camden Nash RN  Outcome: Progressing     Problem: Knowledge Deficit  Goal: Patient/family/caregiver demonstrates understanding of disease process, treatment plan, medications, and discharge instructions  Description: Complete learning assessment and assess knowledge base  Interventions:  - Provide teaching at level of understanding  - Provide teaching via preferred learning methods  3/19/2023 1645 by Wally Cortez RN  Outcome: Progressing  3/19/2023 [address] 81  by Wally Cortez RN  Outcome: Progressing  Goal: Verbalizes understanding of labor plan  Description: Assess patient/family/caregiver's baseline knowledge level and ability to understand information  Provide education via patient/family/caregiver's preferred learning method at appropriate level of understanding  1  Provide teaching at level of understanding  2  Provide teaching via preferred learning method(s)  3/19/2023 1645 by Wally Cortez RN  Outcome: Progressing  3/19/2023 1645 by Wally Cortez RN  Outcome: Progressing     Problem: DISCHARGE PLANNING  Goal: Discharge to home or other facility with appropriate resources  Description: INTERVENTIONS:  - Identify barriers to discharge w/patient and caregiver  - Arrange for needed discharge resources and transportation as appropriate  - Identify discharge learning needs (meds, wound care, etc )  - Arrange for interpretive services to assist at discharge as needed  - Refer to Case Management Department for coordinating discharge planning if the patient needs post-hospital services based on physician/advanced practitioner order or complex needs related to functional status, cognitive ability, or social support system  3/19/2023 1645 by Wally Cortez RN  Outcome: Progressing  3/19/2023 [address]  by Wally Cortez RN  Outcome: Progressing     Problem: Labor & Delivery  Goal: Manages discomfort  Description: Assess and monitor for signs and symptoms of discomfort  Assess patient's pain level regularly and per hospital policy  Administer medications as ordered   Support use of nonpharmacological methods to help control pain such as distraction, imagery, relaxation, and application of heat and cold  Collaborate with interdisciplinary team and patient to determine appropriate pain management plan  1  Include patient in decisions related to comfort  2  Offer non-pharmacological pain management interventions  3  Report ineffective pain management to physician  3/19/2023 1645 by Camden Nash RN  Outcome: Progressing  3/19/2023 1645 by Camden Nash RN  Outcome: Progressing  Goal: Patient vital signs are stable  Description: 1  Assess vital signs - vaginal delivery    3/19/2023 1645 by Camden Nash RN  Outcome: Progressing  3/19/2023 1645 by Camden Nash RN  Outcome: Progressing

## 2023-03-20 ENCOUNTER — TELEPHONE (OUTPATIENT)
Dept: OBGYN CLINIC | Facility: CLINIC | Age: 18
End: 2023-03-20

## 2023-03-20 PROBLEM — Z87.59 HISTORY OF SHOULDER DYSTOCIA IN PRIOR PREGNANCY: Status: ACTIVE | Noted: 2023-03-20

## 2023-03-20 LAB
BASE EXCESS BLDCOA CALC-SCNC: -7.6 MMOL/L (ref 3–11)
BASE EXCESS BLDCOV CALC-SCNC: -6.6 MMOL/L (ref 1–9)
HCO3 BLDCOA-SCNC: 18.1 MMOL/L (ref 17.3–27.3)
HCO3 BLDCOV-SCNC: 17.9 MMOL/L (ref 12.2–28.6)
O2 CT VFR BLDCOA CALC: 16.5 ML/DL
OXYHGB MFR BLDCOA: 79.5 %
OXYHGB MFR BLDCOV: 90.5 %
PCO2 BLDCOA: 37.6 MM[HG] (ref 30–60)
PCO2 BLDCOV: 33.2 MM HG (ref 27–43)
PH BLDCOA: 7.3 [PH] (ref 7.23–7.43)
PH BLDCOV: 7.35 [PH] (ref 7.19–7.49)
PO2 BLDCOA: 39.5 MM HG (ref 5–25)
PO2 BLDCOV: 53.2 MM HG (ref 15–45)
RPR SER QL: NORMAL
SAO2 % BLDCOV: 19 ML/DL
TREPONEMA PALLIDUM IGG+IGM AB [PRESENCE] IN SERUM OR PLASMA BY IMMUNOASSAY: REACTIVE

## 2023-03-20 PROCEDURE — 0HQ9XZZ REPAIR PERINEUM SKIN, EXTERNAL APPROACH: ICD-10-PCS | Performed by: STUDENT IN AN ORGANIZED HEALTH CARE EDUCATION/TRAINING PROGRAM

## 2023-03-20 RX ORDER — ACETAMINOPHEN 325 MG/1
650 TABLET ORAL EVERY 4 HOURS PRN
Status: DISCONTINUED | OUTPATIENT
Start: 2023-03-20 | End: 2023-03-22 | Stop reason: HOSPADM

## 2023-03-20 RX ORDER — LIDOCAINE HYDROCHLORIDE 10 MG/ML
INJECTION, SOLUTION EPIDURAL; INFILTRATION; INTRACAUDAL; PERINEURAL
Status: DISPENSED
Start: 2023-03-20 | End: 2023-03-21

## 2023-03-20 RX ORDER — DOCUSATE SODIUM 100 MG/1
100 CAPSULE, LIQUID FILLED ORAL 2 TIMES DAILY
Status: DISCONTINUED | OUTPATIENT
Start: 2023-03-20 | End: 2023-03-22 | Stop reason: HOSPADM

## 2023-03-20 RX ORDER — IBUPROFEN 600 MG/1
600 TABLET ORAL EVERY 6 HOURS
Status: DISCONTINUED | OUTPATIENT
Start: 2023-03-20 | End: 2023-03-22 | Stop reason: HOSPADM

## 2023-03-20 RX ORDER — CALCIUM CARBONATE 200(500)MG
1000 TABLET,CHEWABLE ORAL DAILY PRN
Status: DISCONTINUED | OUTPATIENT
Start: 2023-03-20 | End: 2023-03-22 | Stop reason: HOSPADM

## 2023-03-20 RX ORDER — DIAPER,BRIEF,INFANT-TODD,DISP
1 EACH MISCELLANEOUS DAILY PRN
Status: DISCONTINUED | OUTPATIENT
Start: 2023-03-20 | End: 2023-03-22 | Stop reason: HOSPADM

## 2023-03-20 RX ORDER — OXYTOCIN/RINGER'S LACTATE 30/500 ML
1-30 PLASTIC BAG, INJECTION (ML) INTRAVENOUS
Status: DISCONTINUED | OUTPATIENT
Start: 2023-03-20 | End: 2023-03-20

## 2023-03-20 RX ORDER — OXYTOCIN/RINGER'S LACTATE 30/500 ML
250 PLASTIC BAG, INJECTION (ML) INTRAVENOUS CONTINUOUS
Status: ACTIVE | OUTPATIENT
Start: 2023-03-20 | End: 2023-03-20

## 2023-03-20 RX ORDER — LIDOCAINE HYDROCHLORIDE 10 MG/ML
10 INJECTION, SOLUTION EPIDURAL; INFILTRATION; INTRACAUDAL; PERINEURAL ONCE
Status: COMPLETED | OUTPATIENT
Start: 2023-03-20 | End: 2023-03-20

## 2023-03-20 RX ORDER — DIPHENHYDRAMINE HCL 25 MG
25 TABLET ORAL EVERY 6 HOURS PRN
Status: DISCONTINUED | OUTPATIENT
Start: 2023-03-20 | End: 2023-03-22 | Stop reason: HOSPADM

## 2023-03-20 RX ORDER — SIMETHICONE 80 MG
80 TABLET,CHEWABLE ORAL 4 TIMES DAILY PRN
Status: DISCONTINUED | OUTPATIENT
Start: 2023-03-20 | End: 2023-03-22 | Stop reason: HOSPADM

## 2023-03-20 RX ORDER — ONDANSETRON 2 MG/ML
4 INJECTION INTRAMUSCULAR; INTRAVENOUS EVERY 8 HOURS PRN
Status: DISCONTINUED | OUTPATIENT
Start: 2023-03-20 | End: 2023-03-22 | Stop reason: HOSPADM

## 2023-03-20 RX ADMIN — SODIUM CHLORIDE 2.5 MILLION UNITS: 9 INJECTION, SOLUTION INTRAVENOUS at 00:00

## 2023-03-20 RX ADMIN — SODIUM CHLORIDE 2.5 MILLION UNITS: 9 INJECTION, SOLUTION INTRAVENOUS at 08:40

## 2023-03-20 RX ADMIN — LIDOCAINE HYDROCHLORIDE 10 ML: 10 INJECTION, SOLUTION EPIDURAL; INFILTRATION; INTRACAUDAL at 13:20

## 2023-03-20 RX ADMIN — HYDROCORTISONE 1 APPLICATION.: 10 CREAM TOPICAL at 14:55

## 2023-03-20 RX ADMIN — Medication 2 MILLI-UNITS/MIN: at 10:07

## 2023-03-20 RX ADMIN — DOCUSATE SODIUM 100 MG: 100 CAPSULE, LIQUID FILLED ORAL at 17:27

## 2023-03-20 RX ADMIN — Medication 1 APPLICATION.: at 14:55

## 2023-03-20 RX ADMIN — SODIUM CHLORIDE 2.5 MILLION UNITS: 9 INJECTION, SOLUTION INTRAVENOUS at 12:34

## 2023-03-20 RX ADMIN — SODIUM CHLORIDE 2.5 MILLION UNITS: 9 INJECTION, SOLUTION INTRAVENOUS at 04:30

## 2023-03-20 RX ADMIN — IBUPROFEN 600 MG: 600 TABLET, FILM COATED ORAL at 14:55

## 2023-03-20 RX ADMIN — SODIUM CHLORIDE, SODIUM LACTATE, POTASSIUM CHLORIDE, AND CALCIUM CHLORIDE 125 ML/HR: .6; .31; .03; .02 INJECTION, SOLUTION INTRAVENOUS at 08:06

## 2023-03-20 NOTE — OB LABOR/OXYTOCIN SAFETY PROGRESS
Labor Progress Note - Rosalia Ortega 21 y o  female MRN: [id number]    Unit/Bed#: -01 Encounter: [id number]       Contraction Frequency (minutes): 3-5  Contraction Quality: Mild  Tachysystole: No   Cervical Dilation: 1        Cervical Effacement: 60  Fetal Station: -3  Baseline Rate: 130 bpm  Fetal Heart Rate: 122 BPM                  Vital Signs:   Vitals:    03/19/23 2332   BP: 139/95   Pulse: 92   Resp:    Temp:        Notes/comments: FB remains in place  FHT Cat I  At one point, FHR appears to decrease, at bedside, Novii had lost signal  When continuous, FHR reactive with baseline 130bpm, moderate variability and 15x15 accels  Discussed with patient that she meets criteria for preeclampsia without severe features, which are elevated Bps >4hrs apart and PCr 1 57  Discussed severe features, which include SRBPS, headache, scotoma, RUQ, SOB  Patient advised to inform if she develops any of these symptoms  Patient desires waiting until FB out to decide if she would like to start pitocin           Isi Kaplan MD 3/19/2023 11:34 PM

## 2023-03-20 NOTE — OB LABOR/OXYTOCIN SAFETY PROGRESS
Oxytocin Safety Progress Check Note - Lee Mandel 21 y o  female MRN: [id number]    Unit/Bed#: -01 Encounter: [id number]    Dose (yesi-units/min) Oxytocin: 10 yesi-units/min  Contraction Frequency (minutes): 2-3  Contraction Quality: Moderate  Tachysystole: No   Cervical Dilation: 7        Cervical Effacement: 90  Fetal Station: 0  Baseline Rate: 140 bpm  Fetal Heart Rate: 138 BPM  FHR Category: Category I               Vital Signs:   Vitals:    03/20/23 1200   BP: 147/89   Pulse: 104   Resp:    Temp: 98 2 °F (36 8 °C)       Notes/comments:   SVE as above  Patient is currently uncomfortable without epidural  Continue pitocin titration  Discussed with Dr Amandeep Bunch MD 3/20/2023 12:18 PM

## 2023-03-20 NOTE — OB LABOR/OXYTOCIN SAFETY PROGRESS
Labor Progress Note - Lee Mandel 21 y o  female MRN: [id number]    Unit/Bed#: -01 Encounter: [id number]       Contraction Frequency (minutes): 2-3 5  Contraction Quality: Moderate  Tachysystole: No   Cervical Dilation: 5        Cervical Effacement: 90  Fetal Station: -1  Baseline Rate: 140 bpm  Fetal Heart Rate: 143 BPM  FHR Category: Category I               Vital Signs:   Vitals:    03/20/23 0743   BP: 133/87   Pulse: 101   Resp:    Temp:        Notes/comments:   SVE as above  Patient is currently uncomfortable without epidural    Discuss with patient initiating pitocin  Discussed with Dr Amandeep Bunch MD 3/20/2023 9:02 AM

## 2023-03-20 NOTE — TELEPHONE ENCOUNTER
----- Message from 22 Valentina Lynn sent at 3/19/2023  3:49 PM EDT -----  Regarding: Labor  Contact: [phone]  Hello  My mucus plug came out this morning, and my water broke but don’t have really hard contractions yet  But I am bleeding every time I use the bathroom   But I am at the hospital now, I want to get checked and get in before 7 tonight

## 2023-03-20 NOTE — L&D DELIVERY NOTE
Vaginal Delivery Summary - OB/GYN   Taran Cash 21 y o  female MRN: [id number]  Unit/Bed#: -01 Encounter: [id number]      Pre-delivery Diagnosis:   1  Pregnancy at 41+1   2  GBS positive  3  Rh negative    Post-delivery Diagnosis: same, delivered    Procedure: Spontaneous Vaginal Delivery     Attending: Yenni Hyde MD    Assistant(s): Tulio Chang MD    Anesthesia: local for laceration repair    QBL: 06NF    Complications: shoulder dystocia    Specimens:   1  Arterial and venous cord gases  2  Cord blood  3  Segment of umbilical cord  4  Placenta to storage     Findings:  1  Viable male on 3/20/2023 at 1310, with APGARS of 5 and 8 at 1 and 5 minutes respectively,  2  Spontaneous delivery of intact placenta at 1315  3  1 degree laceration repaired with 3-0 vicryl rapid  4  Blood gases:   Arterial pH: 7 301   Arterial base excess: -7 6   Venous pH: 7 349   Venous base excess: -6 6    Disposition:  Patient tolerated the procedure well and was recovering in labor and delivery room       Brief history and labor course:  Ms Taran Cash is a 21 y o   at 41w0d  She presented to labor and delivery for induction  Her pregnancy was uncomplicated  On exam in triage she was noted to be 1 cm  She was admitted for induction and received blankenship, pitocin and AROM progressing to complete  Description of procedure:  After pushing for 29 minutes, at 1310 patient delivered a viable male , wt pending, apgars of 5 (1 min) and 8 (5 min)  The fetal vertex delivered spontaneously  There was no nuchal cord  The right anterior shoulder did not deliver with maternal expulsive forces and the assistance of gentle downward traction  A shoulder dystocia was identified  The patient was placed in [address]  The posterior arm was palpated and fully extended  An axillary sling was made with delivering provider hand and the posterior should rotated anterior resulting in delivery   The contralateral shoulder delivered with maternal expulsive forces and the assistance of gentle upward traction  The remainder of the fetus delivered spontaneously  Total shoulder dystocia time <30 seconds    Upon delivery, the infant was placed on the mothers abdomen and the cord was clamped and cut  The infant was noted to cry spontaneously and was moving all extremities appropriately  There was no evidence for injury  Awaiting nurse resuscitators evaluated the   Arterial and venous cord blood gases and cord blood was collected for analysis  These were promptly sent to the lab  In the immediate post-partum, 30 units of IV pitocin was administered, and the uterus was noted to contract down well with massage and pitocin  The placenta delivered spontaneously at 1315 and was noted to have a centrally inserted 3 vessel cord  The vagina, cervix, perineum, and rectum were inspected and there was noted to be a first degree perineal laceration  Laceration Repair  10 cc 1% lidocaine injected in the laceration bed  A first degree was identified and required repair for hemostasis  Laceration was repaired with 3-0 Vicryl rapid in running fashion to reapproximate the laceration  Good hemostasis was confirmed at the conclusion of this procedure  At the conclusion of the procedure, all needle, sponge, and instrument counts were noted to be correct  Patient tolerated the procedure well and was allowed to recover in labor and delivery room with family and  before being transferred to the post-partum floor  I was present and participated in all key portions of the case        Diana Medina MD  3/20/2023  2:48 PM

## 2023-03-20 NOTE — OB LABOR/OXYTOCIN SAFETY PROGRESS
Oxytocin Safety Progress Check Note - Yanique Grossman 21 y o  female MRN: [id number]    Unit/Bed#: -01 Encounter: [id number]    Dose (yesi-units/min) Oxytocin: 10 yesi-units/min  Contraction Frequency (minutes): 2-3  Contraction Quality: Moderate  Tachysystole: No   Cervical Dilation: 10  Dilation Complete Date: 03/20/23  Dilation Complete Time: 1220  Cervical Effacement: 100  Fetal Station: 0  Baseline Rate: 140 bpm  Fetal Heart Rate: 138 BPM  FHR Category: Category I               Vital Signs:   Vitals:    03/20/23 1200   BP: 147/89   Pulse: 104   Resp:    Temp: 98 2 °F (36 8 °C)       Notes/comments:   Patient becoming very uncomfortable with contractions and feeling urge to push  SVE is complete/ and 0 station  Will begin pushing shortly    D/w Dr Jaylen Vizcarra MD 3/20/2023 12:36 PM

## 2023-03-20 NOTE — PLAN OF CARE
Problem: PAIN - ADULT  Goal: Verbalizes/displays adequate comfort level or baseline comfort level  Description: Interventions:  - Encourage patient to monitor pain and request assistance  - Assess pain using appropriate pain scale  - Administer analgesics based on type and severity of pain and evaluate response  - Implement non-pharmacological measures as appropriate and evaluate response  - Consider cultural and social influences on pain and pain management  - Notify physician/advanced practitioner if interventions unsuccessful or patient reports new pain  Outcome: Progressing     Problem: INFECTION - ADULT  Goal: Absence or prevention of progression during hospitalization  Description: INTERVENTIONS:  - Assess and monitor for signs and symptoms of infection  - Monitor lab/diagnostic results  - Monitor all insertion sites, i e  indwelling lines, tubes, and drains  - Monitor endotracheal if appropriate and nasal secretions for changes in amount and color  - [place] appropriate cooling/warming therapies per order  - Administer medications as ordered  - Instruct and encourage patient and family to use good hand hygiene technique  - Identify and instruct in appropriate isolation precautions for identified infection/condition  Outcome: Progressing  Goal: Absence of fever/infection during neutropenic period  Description: INTERVENTIONS:  - Monitor WBC    Outcome: Progressing     Problem: SAFETY ADULT  Goal: Patient will remain free of falls  Description: INTERVENTIONS:  - Educate patient/family on patient safety including physical limitations  - Instruct patient to call for assistance with activity   - Consult OT/PT to assist with strengthening/mobility   - Keep Call bell within reach  - Keep bed low and locked with side rails adjusted as appropriate  - Keep care items and personal belongings within reach  - Initiate and maintain comfort rounds  - Make Fall Risk Sign visible to staff  - Offer Toileting every  Hours, in advance of need  - Initiate/Maintain alarm  - Obtain necessary fall risk management equipment:   - Apply yellow socks and bracelet for high fall risk patients  - Consider moving patient to room near nurses station  Outcome: Progressing  Goal: Maintain or return to baseline ADL function  Description: INTERVENTIONS:  -  Assess patient's ability to carry out ADLs; assess patient's baseline for ADL function and identify physical deficits which impact ability to perform ADLs (bathing, care of mouth/teeth, toileting, grooming, dressing, etc )  - Assess/evaluate cause of self-care deficits   - Assess range of motion  - Assess patient's mobility; develop plan if impaired  - Assess patient's need for assistive devices and provide as appropriate  - Encourage maximum independence but intervene and supervise when necessary  - Involve family in performance of ADLs  - Assess for home care needs following discharge   - Consider OT consult to assist with ADL evaluation and planning for discharge  - Provide patient education as appropriate  Outcome: Progressing  Goal: Maintains/Returns to pre admission functional level  Description: INTERVENTIONS:  - Perform BMAT or MOVE assessment daily    - Set and communicate daily mobility goal to care team and patient/family/caregiver  - Collaborate with rehabilitation services on mobility goals if consulted  - Perform Range of Motion  times a day  - Reposition patient every  hours    - Dangle patient  times a day  - Stand patient  times a day  - Ambulate patient  times a day  - Out of bed to chair  times a day   - Out of bed for meals  times a day  - Out of bed for toileting  - Record patient progress and toleration of activity level   Outcome: Progressing     Problem: DISCHARGE PLANNING  Goal: Discharge to home or other facility with appropriate resources  Description: INTERVENTIONS:  - Identify barriers to discharge w/patient and caregiver  - Arrange for needed discharge resources and transportation as appropriate  - Identify discharge learning needs (meds, wound care, etc )  - Arrange for interpretive services to assist at discharge as needed  - Refer to Case Management Department for coordinating discharge planning if the patient needs post-hospital services based on physician/advanced practitioner order or complex needs related to functional status, cognitive ability, or social support system  Outcome: Progressing     Problem: POSTPARTUM  Goal: Experiences normal postpartum course  Description: INTERVENTIONS:  - Monitor maternal vital signs  - Assess uterine involution and lochia  Outcome: Progressing  Goal: Appropriate maternal -  bonding  Description: INTERVENTIONS:  - Identify family support  - Assess for appropriate maternal/infant bonding   -Encourage maternal/infant bonding opportunities  - Referral to  or  as needed  Outcome: Progressing  Goal: Establishment of infant feeding pattern  Description: INTERVENTIONS:  - Assess breast/bottle feeding  - Refer to lactation as needed  Outcome: Progressing  Goal: Incision(s), wounds(s) or drain site(s) healing without S/S of infection  Description: INTERVENTIONS  - Assess and document dressing, incision, wound bed, drain sites and surrounding tissue  - Provide patient and family education  - Perform skin care/dressing changes every   Outcome: Progressing

## 2023-03-20 NOTE — OB LABOR/OXYTOCIN SAFETY PROGRESS
Labor Progress Note - Patricia Alicea 21 y o  female MRN: [id number]    Unit/Bed#: -01 Encounter: [id number]       Contraction Frequency (minutes): 1-3  Contraction Quality: Mild  Tachysystole: No   Cervical Dilation: 1        Cervical Effacement: 60  Fetal Station: -3  Baseline Rate: 135 bpm  Fetal Heart Rate: 141 BPM                Vital Signs:   Vitals:    03/19/23 1925   BP: 134/88   Pulse: 88   Resp: 16   Temp: 98 °F (36 7 °C)       Notes/comments: Blankenship Balloon Induction Procedure    Reason for induction: elective  Induction plan previously discussed with Dr Kaity Bhat  Procedural risks reviewed, consent obtained  The patient was placed in dorsal lithotomy position  Using Sterile technique, a 24F blankenship balloon was advanced beyond the internal cervix os and inflated with 60cc LR  The catheter was clamped with an umbilical cord clamp and instructions were given to the patient's nurse to weight the balloon with a 1-L bag of LR  The procedure was uncomplicated and the patient tolerated the procedure well  The procedure was completed at 2045  Patient does not desire starting pitocin at this time      Luis Lisa MD 3/19/2023 8:46 PM

## 2023-03-20 NOTE — PLAN OF CARE
Problem: PAIN - ADULT  Goal: Verbalizes/displays adequate comfort level or baseline comfort level  Description: Interventions:  - Encourage patient to monitor pain and request assistance  - Assess pain using appropriate pain scale  - Administer analgesics based on type and severity of pain and evaluate response  - Implement non-pharmacological measures as appropriate and evaluate response  - Consider cultural and social influences on pain and pain management  - Notify physician/advanced practitioner if interventions unsuccessful or patient reports new pain  Outcome: Progressing     Problem: INFECTION - ADULT  Goal: Absence or prevention of progression during hospitalization  Description: INTERVENTIONS:  - Assess and monitor for signs and symptoms of infection  - Monitor lab/diagnostic results  - Monitor all insertion sites, i e  indwelling lines, tubes, and drains  - Monitor endotracheal if appropriate and nasal secretions for changes in amount and color  - [place] appropriate cooling/warming therapies per order  - Administer medications as ordered  - Instruct and encourage patient and family to use good hand hygiene technique  - Identify and instruct in appropriate isolation precautions for identified infection/condition  Outcome: Progressing  Goal: Absence of fever/infection during neutropenic period  Description: INTERVENTIONS:  - Monitor WBC    Outcome: Progressing     Problem: SAFETY ADULT  Goal: Patient will remain free of falls  Description: INTERVENTIONS:  - Educate patient/family on patient safety including physical limitations  - Instruct patient to call for assistance with activity   - Consult OT/PT to assist with strengthening/mobility   - Keep Call bell within reach  - Keep bed low and locked with side rails adjusted as appropriate  - Keep care items and personal belongings within reach  - Initiate and maintain comfort rounds  - Make Fall Risk Sign visible to staff    - Apply yellow socks and bracelet for high fall risk patients  - Consider moving patient to room near nurses station  Outcome: Progressing  Goal: Maintain or return to baseline ADL function  Description: INTERVENTIONS:  -  Assess patient's ability to carry out ADLs; assess patient's baseline for ADL function and identify physical deficits which impact ability to perform ADLs (bathing, care of mouth/teeth, toileting, grooming, dressing, etc )  - Assess/evaluate cause of self-care deficits   - Assess range of motion  - Assess patient's mobility; develop plan if impaired  - Assess patient's need for assistive devices and provide as appropriate  - Encourage maximum independence but intervene and supervise when necessary  - Involve family in performance of ADLs  - Assess for home care needs following discharge   - Consider OT consult to assist with ADL evaluation and planning for discharge  - Provide patient education as appropriate  Outcome: Progressing  Goal: Maintains/Returns to pre admission functional level  Description: INTERVENTIONS:  - Perform BMAT or MOVE assessment daily    - Set and communicate daily mobility goal to care team and patient/family/caregiver  - Collaborate with rehabilitation services on mobility goals if consulted    - Out of bed for toileting  - Record patient progress and toleration of activity level   Outcome: Progressing     Problem: Knowledge Deficit  Goal: Patient/family/caregiver demonstrates understanding of disease process, treatment plan, medications, and discharge instructions  Description: Complete learning assessment and assess knowledge base  Interventions:  - Provide teaching at level of understanding  - Provide teaching via preferred learning methods  Outcome: Progressing  Goal: Verbalizes understanding of labor plan  Description: Assess patient/family/caregiver's baseline knowledge level and ability to understand information    Provide education via patient/family/caregiver's preferred learning method at appropriate level of understanding  1  Provide teaching at level of understanding  2  Provide teaching via preferred learning method(s)  Outcome: Progressing     Problem: DISCHARGE PLANNING  Goal: Discharge to home or other facility with appropriate resources  Description: INTERVENTIONS:  - Identify barriers to discharge w/patient and caregiver  - Arrange for needed discharge resources and transportation as appropriate  - Identify discharge learning needs (meds, wound care, etc )  - Arrange for interpretive services to assist at discharge as needed  - Refer to Case Management Department for coordinating discharge planning if the patient needs post-hospital services based on physician/advanced practitioner order or complex needs related to functional status, cognitive ability, or social support system  Outcome: Progressing     Problem: Labor & Delivery  Goal: Manages discomfort  Description: Assess and monitor for signs and symptoms of discomfort  Assess patient's pain level regularly and per hospital policy  Administer medications as ordered  Support use of nonpharmacological methods to help control pain such as distraction, imagery, relaxation, and application of heat and cold  Collaborate with interdisciplinary team and patient to determine appropriate pain management plan  1  Include patient in decisions related to comfort  2  Offer non-pharmacological pain management interventions  3  Report ineffective pain management to physician  Outcome: Progressing  Goal: Patient vital signs are stable  Description: 1  Assess vital signs - vaginal delivery    Outcome: Progressing

## 2023-03-20 NOTE — OB LABOR/OXYTOCIN SAFETY PROGRESS
Oxytocin Safety Progress Check Note - Matteo Solders 21 y o  female MRN: [id number]    Unit/Bed#: -01 Encounter: [id number]    Dose (yesi-units/min) Oxytocin: 6 yesi-units/min  Contraction Frequency (minutes): 2-5  Contraction Quality: Moderate  Tachysystole: No   Cervical Dilation: 6        Cervical Effacement: 90  Fetal Station: -1  Baseline Rate: 140 bpm  Fetal Heart Rate: 142 BPM  FHR Category: Category I               Vital Signs:   Vitals:    03/20/23 1115   BP: (!) 158/108   Pulse: 89   Resp:    Temp:        Notes/comments:   SVE as above  Patient is currently uncomfortable without epidural    Continue pitocin titration    Discussed with Dr Dulce Maria Ricci MD 3/20/2023 11:18 AM

## 2023-03-20 NOTE — OB LABOR/OXYTOCIN SAFETY PROGRESS
Labor Progress Note - Orla Lobe 21 y o  female MRN: [id number]    Unit/Bed#: -01 Encounter: [id number]       Contraction Frequency (minutes): 2-3  Contraction Quality: Mild  Tachysystole: No   Cervical Dilation: 5        Cervical Effacement: 70  Fetal Station: -2  Baseline Rate: 130 bpm  Fetal Heart Rate: 158 BPM                  Vital Signs:   Vitals:    03/20/23 0053   BP: 125/79   Pulse: 98   Resp:    Temp:        Notes/comments: FB out, SVE as above  FHT Cat I  Patient does not desire pitocin at this time  Will reassess in two hours and consider AROM           Jailene Daniels MD 3/20/2023 1:36 AM

## 2023-03-20 NOTE — OB LABOR/OXYTOCIN SAFETY PROGRESS
Labor Progress Note - Hood Favors 21 y o  female MRN: [id number]    Unit/Bed#: -01 Encounter: [id number]       Contraction Frequency (minutes): 1-3 5  Contraction Quality: Mild  Tachysystole: No   Cervical Dilation: 5        Cervical Effacement: 80  Fetal Station: -2  Baseline Rate: 130 bpm  Fetal Heart Rate: 154 BPM                  Vital Signs:   Vitals:    03/20/23 0314   BP: 99/54   Pulse: 83   Resp:    Temp:        Notes/comments: SVE as above  Amniotomy for clear fluid performed  FHT Cat I  Discussed with patient that should she continue to not progress after amniotomy, we would consider starting pitocin due to risk of infection with prolonged ruptured membranes  Patient understanding           Evelin Luke MD 3/20/2023 4:37 AM

## 2023-03-20 NOTE — OB LABOR/OXYTOCIN SAFETY PROGRESS
Labor Progress Note - Del Ma 21 y o  female MRN: [id number]    Unit/Bed#: -01 Encounter: [id number]       Contraction Frequency (minutes): 2 5-3 5  Contraction Quality: Mild  Tachysystole: No   Cervical Dilation: 5        Cervical Effacement: 80  Fetal Station: -1  Baseline Rate: 135 bpm  Fetal Heart Rate: 141 BPM                  Vital Signs:   Vitals:    03/20/23 0603   BP: 125/80   Pulse: (!) 116   Resp:    Temp:        Notes/comments: SVE unchanged  Patient contraction q2-4 min  BP normal  FHT Cat II with two variable decelerations  No other decelerations, moderate variability, spontaneous accels           Estela Sanchez MD 3/20/2023 6:51 AM

## 2023-03-20 NOTE — DISCHARGE SUMMARY
Obstetrics Discharge Summary  Cayetano Fischer 21 y o  female MRN: [id number]  Unit/Bed#: -01 Encounter: [id number]    Admission Date: 3/19/2023     Discharge Date: 3/22/2023    Admitting Attending: Isai Car Attending: Eric Guzman  Discharging Attending: Oleg    Admitting Diagnoses:   Pregnancy at 41w  Rh neg  GBS positive    Discharge Diagnoses:   Same, delivered  Shoulder dystocia  Preeclampsia without severe features    Procedures: spontaneous vaginal delivery    Anesthesia: local    Hospital course: Cayetano Fischer is now a 21 y o  [place]ica [place] who was initially admitted at 41w0d for elective induction of labor  At presentation she was /-3  A blankenship balloon was placed  An amniotomy was performed for clear fluid  She was started on pitocin for augmentation  She met criteria for preeclampsia without severe features during her labor course with a PC ratio of 1 57  She progressed to complete cervical dilation and began pushing  She had a non-sustained severe range pressure while pushing  On 3/20/23 she delivered a viable male  41w1d via normal spontaneous vaginal delivery complicated by a <10T shoulder dystocia  She sustained a 1st degree laceration during delivery which was adequately repaired  's birth weight was 7lbs 9 2oz; Apgars were 5 (1 min) and 8 (5 min)   was admitted to the  nursery  Patient tolerated the procedure well  Her post-delivery course was uncomplicated  Her pressures remained non-severe  She remained asymptomatic  Her postpartum pain was well controlled with oral analgesics  Maternal blood type is O negative so RhoGAM was indicated  On day of discharge, she was ambulating and able to reasonably perform all ADLs  She was voiding and had appropriate bowel function  Pain was well controlled  She was discharged home on postpartum day #2 without complications   Patient was instructed to follow up with her OBGYN as an outpatient and was given appropriate warnings to call provider if she develops signs of infection or uncontrolled pain  Complications: none apparent    Condition at discharge: good     Provisions for Follow-Up Care:  Please see after visit summary for information related to follow-up care and any pertinent home health orders  Disposition: Home    Planned Readmission: No    Discharge Medications:   Please see AVS for a complete list of discharge medications  Discharge instructions :   Please see AVS for complete discharge instructions

## 2023-03-21 PROBLEM — O14.93 PRE-ECLAMPSIA IN THIRD TRIMESTER: Status: ACTIVE | Noted: 2023-03-21

## 2023-03-21 LAB
ABO GROUP BLD: NORMAL
BLD GP AB SCN SERPL QL: NEGATIVE
FETAL CELL SCN BLD QL ROSETTE: NEGATIVE
RH BLD: NEGATIVE
T PALLIDUM AB SER QL IF: NON REACTIVE

## 2023-03-21 RX ADMIN — IBUPROFEN 600 MG: 600 TABLET, FILM COATED ORAL at 16:05

## 2023-03-21 RX ADMIN — HUMAN RHO(D) IMMUNE GLOBULIN 300 MCG: 300 INJECTION, SOLUTION INTRAMUSCULAR at 16:07

## 2023-03-21 RX ADMIN — DOCUSATE SODIUM 100 MG: 100 CAPSULE, LIQUID FILLED ORAL at 16:06

## 2023-03-21 NOTE — LACTATION NOTE
This note was copied from a baby's chart  CONSULT - LACTATION  Baby Boy Elaine ) Inderjit 1 days male MRN: [id number]    [address] Room / Bed:  307(N)/ 307(N) Encounter: [id number]    Maternal Information     MOTHER:  Sofia Jansen  Maternal Age: 21 y o    OB History: # 1 - Date: 03/20/23, Sex: Male, Weight: 3435 g (7 lb 9 2 oz), GA: 41w1d, Delivery: Vaginal, Spontaneous, Apgar1: 5, Apgar5: 8, Living: Living, Birth Comments: None   Previouse breast reduction surgery? No    Lactation history:   Has patient previously breast fed: No   How long had patient previously breast fed:     Previous breast feeding complications:     History reviewed  No pertinent surgical history  Birth information:  YOB: 2025   Time of birth: 1:10 PM   Sex: male   Delivery type: Vaginal, Spontaneous   Birth Weight: 3435 g (7 lb 9 2 oz)   Percent of Weight Change: -3%     Gestational Age: 40w1d   [unfilled]      Feeding recommendations:  breast feed on demand     Met with mother  Provided mother with Ready, Set, Baby booklet  Discussed Skin to Skin contact an benefits to mom and baby  Talked about the delay of the first bath until baby has adjusted  Spoke about the benefits of rooming in  Feeding on cue and what that means for recognizing infant's hunger  Avoidance of pacifiers for the first month discussed  Talked about exclusive breastfeeding for the first 6 months  Positioning and latch reviewed as well as showing images of other feeding positions  Discussed the properties of a good latch in any position  Reviewed hand/manual expression  Discussed s/s that baby is getting enough milk and some s/s that breastfeeding dyad may need further help  Gave information on common concerns, what to expect the first few weeks after delivery, preparing for other caregivers, and how partners can help  Resources for support also provided      Information on hand expression given  Discussed benefits of knowing how to manually express breast including stimulating milk supply, softening nipple for latch and evacuating breast in the event of engorgement  Discussed 2nd night syndrome and ways to calm infant  Hand out given  Provided DC booklet at this time, enc family to review and prepare questions for day of DC  Mom has a breast pump at home  Enc her to call when baby shows cues for latching support       Margarette Hayden RN 3/21/2023 1:19 PM

## 2023-03-21 NOTE — PLAN OF CARE
Problem: PAIN - ADULT  Goal: Verbalizes/displays adequate comfort level or baseline comfort level  Description: Interventions:  - Encourage patient to monitor pain and request assistance  - Assess pain using appropriate pain scale  - Administer analgesics based on type and severity of pain and evaluate response  - Implement non-pharmacological measures as appropriate and evaluate response  - Consider cultural and social influences on pain and pain management  - Notify physician/advanced practitioner if interventions unsuccessful or patient reports new pain  Outcome: Progressing     Problem: INFECTION - ADULT  Goal: Absence or prevention of progression during hospitalization  Description: INTERVENTIONS:  - Assess and monitor for signs and symptoms of infection  - Monitor lab/diagnostic results  - Monitor all insertion sites, i e  indwelling lines, tubes, and drains  - Monitor endotracheal if appropriate and nasal secretions for changes in amount and color  - [place] appropriate cooling/warming therapies per order  - Administer medications as ordered  - Instruct and encourage patient and family to use good hand hygiene technique  - Identify and instruct in appropriate isolation precautions for identified infection/condition  Outcome: Progressing  Goal: Absence of fever/infection during neutropenic period  Description: INTERVENTIONS:  - Monitor WBC    Outcome: Progressing     Problem: SAFETY ADULT  Goal: Patient will remain free of falls  Description: INTERVENTIONS:  - Educate patient/family on patient safety including physical limitations  - Instruct patient to call for assistance with activity   - Consult OT/PT to assist with strengthening/mobility   - Keep Call bell within reach  - Keep bed low and locked with side rails adjusted as appropriate  - Keep care items and personal belongings within reach  - Initiate and maintain comfort rounds  - Make Fall Risk Sign visible to staff  - Offer Toileting every  Hours, in advance of need  - Initiate/Maintain alarm  - Obtain necessary fall risk management equipment:   - Apply yellow socks and bracelet for high fall risk patients  - Consider moving patient to room near nurses station  Outcome: Progressing  Goal: Maintain or return to baseline ADL function  Description: INTERVENTIONS:  -  Assess patient's ability to carry out ADLs; assess patient's baseline for ADL function and identify physical deficits which impact ability to perform ADLs (bathing, care of mouth/teeth, toileting, grooming, dressing, etc )  - Assess/evaluate cause of self-care deficits   - Assess range of motion  - Assess patient's mobility; develop plan if impaired  - Assess patient's need for assistive devices and provide as appropriate  - Encourage maximum independence but intervene and supervise when necessary  - Involve family in performance of ADLs  - Assess for home care needs following discharge   - Consider OT consult to assist with ADL evaluation and planning for discharge  - Provide patient education as appropriate  Outcome: Progressing  Goal: Maintains/Returns to pre admission functional level  Description: INTERVENTIONS:  - Perform BMAT or MOVE assessment daily    - Set and communicate daily mobility goal to care team and patient/family/caregiver  - Collaborate with rehabilitation services on mobility goals if consulted  - Perform Range of Motion  times a day  - Reposition patient every  hours    - Dangle patient  times a day  - Stand patient times a day  - Ambulate patient  times a day  - Out of bed to chair  times a day   - Out of bed for meals  times a day  - Out of bed for toileting  - Record patient progress and toleration of activity level   Outcome: Progressing     Problem: DISCHARGE PLANNING  Goal: Discharge to home or other facility with appropriate resources  Description: INTERVENTIONS:  - Identify barriers to discharge w/patient and caregiver  - Arrange for needed discharge resources and transportation as appropriate  - Identify discharge learning needs (meds, wound care, etc )  - Arrange for interpretive services to assist at discharge as needed  - Refer to Case Management Department for coordinating discharge planning if the patient needs post-hospital services based on physician/advanced practitioner order or complex needs related to functional status, cognitive ability, or social support system  Outcome: Progressing     Problem: POSTPARTUM  Goal: Experiences normal postpartum course  Description: INTERVENTIONS:  - Monitor maternal vital signs  - Assess uterine involution and lochia  Outcome: Progressing  Goal: Appropriate maternal -  bonding  Description: INTERVENTIONS:  - Identify family support  - Assess for appropriate maternal/infant bonding   -Encourage maternal/infant bonding opportunities  - Referral to  or  as needed  Outcome: Progressing  Goal: Establishment of infant feeding pattern  Description: INTERVENTIONS:  - Assess breast/bottle feeding  - Refer to lactation as needed  Outcome: Progressing  Goal: Incision(s), wounds(s) or drain site(s) healing without S/S of infection  Description: INTERVENTIONS  - Assess and document dressing, incision, wound bed, drain sites and surrounding tissue  - Provide patient and family education  - Perform skin care/dressing changes every outcome: Progressing

## 2023-03-21 NOTE — ASSESSMENT & PLAN NOTE
Systolic (48CQE), DGF:752 , Min:129 , DSA:177   Diastolic (90WSA), VAH:06, Min:76, Max:86    Cbc/cmp wnl PC: 1 57  asymptomatic

## 2023-03-21 NOTE — PROGRESS NOTES
Progress Note - OB/GYN  Thalia Tolentino 21 y o  female MRN: [id number]  Unit/Bed#: -01 Encounter: [id number]    Assessment and Plan     Thalia Tolentino is a patient of: [facility]  She is PPD# 1 s/p  spontaneous vaginal delivery complicated by preeclampsia without severe features  Recovering well and is stable       Pre-eclampsia in third trimester  Assessment & Plan  Systolic (62IEF), GNZ:791 , Min:122 , AA   Diastolic (51UIB), FNL:92, Min:60, Max:83    Cbc/cmp wnl PC: 1 57  asymptomatic    Positive GBS test  Assessment & Plan  S/p adequate treatment    Rh negative state in antepartum period  Assessment & Plan  Rhogam ordered    *  (spontaneous vaginal delivery)  Assessment & Plan  Lochia WNL   Recovering well   Appropriate bowel and bladder function   Pain well controlled   Tolerating diet   Breastfeeding   Ambulating without issues   No lower extremity tenderness  GBS pos, adequate treatment   Rh neg         Disposition    - Anticipate discharge home on PPD# 2      Subjective/Objective     Chief Complaint: Postpartum State     Subjective:    Thalia Tolentino is PPD#1 s/p  spontaneous vaginal delivery  She has no current complaints  Pain is well controlled  Patient is currently voiding  She is ambulating  Patient is not currently passing flatus and has had no bowel movement  She is tolerating PO, and denies nausea or vomitting  Patient denies fever, chills, chest pain, shortness of breath, or calf tenderness  Lochia is normal  She is  Breastfeeding  She is recovering well and is stable         Vitals:   /60 (BP Location: Right arm)   Pulse 98   Temp 98 °F (36 7 °C) (Oral)   Resp 18   Ht 5' (1 524 m)   Wt 64 9 kg (143 lb)   LMP 2022 (Exact Date)   SpO2 99%   Breastfeeding Yes   BMI 27 93 kg/m²       Intake/Output Summary (Last 24 hours) at 3/21/2023 0636  Last data filed at 3/20/2023 2147  Gross per 24 hour   Intake --   Output 872 ml   Net -872 ml Invasive Devices     None                 Physical Exam:   GEN: Destiney H Inderjit appears well, alert, pleasant and cooperative   CARDIO: RRR  RESP:  Normal respiratory effort  ABDOMEN: Soft, no tenderness, no distention, fundus firm  EXTREMITIES: Non tender, no erythema, b/l Nayana's sign negative      Labs:     Hemoglobin   Date Value Ref Range Status   03/19/2023 12 0 11 5 - 15 4 g/dL Final   01/31/2023 10 8 (L) 11 5 - 15 4 g/dL Final     WBC   Date Value Ref Range Status   03/19/2023 10 96 (H) 4 31 - 10 16 Thousand/uL Final   01/31/2023 12 17 (H) 4 31 - 10 16 Thousand/uL Final     Platelets   Date Value Ref Range Status   03/19/2023 244 149 - 390 Thousands/uL Final   01/31/2023 309 149 - 390 Thousands/uL Final     Creatinine   Date Value Ref Range Status   03/19/2023 0 51 (L) 0 60 - 1 30 mg/dL Final     Comment:     Standardized to IDMS reference method     AST   Date Value Ref Range Status   03/19/2023 23 13 - 39 U/L Final     Comment:     Specimen collection should occur prior to Sulfasalazine administration due to the potential for falsely depressed results  ALT   Date Value Ref Range Status   03/19/2023 10 7 - 52 U/L Final     Comment:     Specimen collection should occur prior to Sulfasalazine administration due to the potential for falsely depressed results             Cynthia Back MD  OBGYN PGY1  3/21/2023  6:36 AM Pneumonia

## 2023-03-21 NOTE — ASSESSMENT & PLAN NOTE
Lochia WNL   Recovering well   Appropriate bowel and bladder function   Pain well controlled   Tolerating diet   Breastfeeding   Ambulating without issues   No lower extremity tenderness  GBS pos, adequate treatment   Rh neg

## 2023-03-21 NOTE — UTILIZATION REVIEW
"NOTIFICATION OF INPATIENT ADMISSION   MATERNITY/DELIVERY AUTHORIZATION REQUEST   SERVICING FACILITY:   [facility] L&D, [place], NICU  [address]  Tax ID: [id number]  NPI: [id number]   ATTENDING PROVIDER:  Attending Name and NPI#: Candido Green [[id number]]  Address: [address] [place] [address]  Phone: [phone]   ADMISSION INFORMATION:  Place of Service: Inpatient [address] [facility]  [address]  Place of Service Code: 21  Inpatient Admission Date/Time: 3/19/23  3:44 PM  Discharge Date/Time: No discharge date for patient encounter  Admitting Diagnosis Code/Description:  41 weeks gestation of pregnancy [O48 0, Z3A 41]  Premature rupture of membranes, unspecified as to length of time between rupture and onset of labor, unspecified weeks of gestation [O42 90]  Vaginal bleeding [N93 9]  Encounter for full-term uncomplicated delivery [M62]     Mother: Vy Hernandez 2005 Estimated Date of Delivery: 3/12/23  Delivering clinician: Debra Pugh    OB History        1    Para   1    Term   1            AB        Living   1       SAB        IAB        Ectopic        Multiple   0    Live Births   1               [place] Name & MRN:   Information for the patient's :  Anthony Dias [[id number]]     [place] Delivery Information:  Sex: male  Delivered 3/20/2023 1:10 PM by Vaginal, Spontaneous; Gestational Age: 40w1d    [place] Measurements:  Weight: 7 lb 9 2 oz (3435 g); Height: 20\"    APGAR 1 minute 5 minutes 10 minutes   Totals: 5 8      [place] Birth Information: 21 y o  female MRN: [id number] Unit/Bed#: -01   Birthweight: No birth weight on file   Gestational Age: <None> Delivery Type:    APGARS Totals:        UTILIZATION REVIEW CONTACT:  Dave Short, Utilization   Network Utilization Review Department  Phone: [phone]  Fax [phone]  Email: " [email or web address]  Contact for approvals/pending authorizations, clinical reviews, and discharge  PHYSICIAN ADVISORY SERVICES:  Medical Necessity Denial & [facility] Review  Phone: [phone]  Fax: [phone]  Email: [email or web address]

## 2023-03-21 NOTE — CASE MANAGEMENT
Case Management Progress Note    Patient name Loretta [facility]  Location /-15 MRN [id number]  : 2005 Date 3/21/2023       LOS (days): 2  Geometric Mean LOS (GMLOS) (days):   Days to GMLOS:        OBJECTIVE:        Current admission status: Inpatient  Preferred Pharmacy:   [facility]/pharmacy #[id number]- [address]  [address]  [address] [address], [address] [facility]  Phone: [phone] Fax: [phone]    Primary Care Provider: Shona Escalante    Primary Insurance: SUSHILA MONTEIRO  Secondary Insurance:     PROGRESS NOTE:    Consult received re: [facility]  moms UDS Negative on admission    Per chart, UDS for both baby and MOB negative  Consult closed  Plan for baby to D/C home with MOB when medically cleared

## 2023-03-22 VITALS
TEMPERATURE: 98.3 F | HEIGHT: 60 IN | HEART RATE: 74 BPM | OXYGEN SATURATION: 91 % | WEIGHT: 143 LBS | DIASTOLIC BLOOD PRESSURE: 87 MMHG | BODY MASS INDEX: 28.07 KG/M2 | SYSTOLIC BLOOD PRESSURE: 136 MMHG | RESPIRATION RATE: 18 BRPM

## 2023-03-22 RX ORDER — ACETAMINOPHEN 325 MG/1
650 TABLET ORAL EVERY 4 HOURS PRN
Refills: 0
Start: 2023-03-22

## 2023-03-22 RX ORDER — DOCUSATE SODIUM 100 MG/1
100 CAPSULE, LIQUID FILLED ORAL 2 TIMES DAILY
Refills: 0
Start: 2023-03-22 | End: 2023-03-30 | Stop reason: ALTCHOICE

## 2023-03-22 RX ORDER — IBUPROFEN 600 MG/1
600 TABLET ORAL EVERY 6 HOURS
Qty: 30 TABLET | Refills: 0 | Status: SHIPPED | OUTPATIENT
Start: 2023-03-22 | End: 2023-04-21

## 2023-03-22 RX ADMIN — IBUPROFEN 600 MG: 600 TABLET, FILM COATED ORAL at 09:38

## 2023-03-22 RX ADMIN — DOCUSATE SODIUM 100 MG: 100 CAPSULE, LIQUID FILLED ORAL at 09:38

## 2023-03-22 NOTE — PLAN OF CARE
Problem: PAIN - ADULT  Goal: Verbalizes/displays adequate comfort level or baseline comfort level  Description: Interventions:  - Encourage patient to monitor pain and request assistance  - Assess pain using appropriate pain scale  - Administer analgesics based on type and severity of pain and evaluate response  - Implement non-pharmacological measures as appropriate and evaluate response  - Consider cultural and social influences on pain and pain management  - Notify physician/advanced practitioner if interventions unsuccessful or patient reports new pain  Outcome: Adequate for Discharge     Problem: INFECTION - ADULT  Goal: Absence or prevention of progression during hospitalization  Description: INTERVENTIONS:  - Assess and monitor for signs and symptoms of infection  - Monitor lab/diagnostic results  - Monitor all insertion sites, i e  indwelling lines, tubes, and drains  - Monitor endotracheal if appropriate and nasal secretions for changes in amount and color  - [place] appropriate cooling/warming therapies per order  - Administer medications as ordered  - Instruct and encourage patient and family to use good hand hygiene technique  - Identify and instruct in appropriate isolation precautions for identified infection/condition  Outcome: Adequate for Discharge  Goal: Absence of fever/infection during neutropenic period  Description: INTERVENTIONS:  - Monitor WBC    Outcome: Adequate for Discharge     Problem: SAFETY ADULT  Goal: Patient will remain free of falls  Description: INTERVENTIONS:  - Educate patient/family on patient safety including physical limitations  - Instruct patient to call for assistance with activity   - Consult OT/PT to assist with strengthening/mobility   - Keep Call bell within reach  - Keep bed low and locked with side rails adjusted as appropriate  - Keep care items and personal belongings within reach  - Initiate and maintain comfort rounds  - Make Fall Risk Sign visible to staff    Outcome: Adequate for Discharge  Goal: Maintain or return to baseline ADL function  Description: INTERVENTIONS:  -  Assess patient's ability to carry out ADLs; assess patient's baseline for ADL function and identify physical deficits which impact ability to perform ADLs (bathing, care of mouth/teeth, toileting, grooming, dressing, etc )  - Assess/evaluate cause of self-care deficits   - Assess range of motion  - Assess patient's mobility; develop plan if impaired  - Assess patient's need for assistive devices and provide as appropriate  - Encourage maximum independence but intervene and supervise when necessary  - Involve family in performance of ADLs  - Assess for home care needs following discharge   - Consider OT consult to assist with ADL evaluation and planning for discharge  - Provide patient education as appropriate  Outcome: Adequate for Discharge  Goal: Maintains/Returns to pre admission functional level  Description: INTERVENTIONS:  - Perform BMAT or MOVE assessment daily    - Set and communicate daily mobility goal to care team and patient/family/caregiver     - Collaborate with rehabilitation services on mobility goals if consulted    - Out of bed for toileting  - Record patient progress and toleration of activity level   Outcome: Adequate for Discharge     Problem: DISCHARGE PLANNING  Goal: Discharge to home or other facility with appropriate resources  Description: INTERVENTIONS:  - Identify barriers to discharge w/patient and caregiver  - Arrange for needed discharge resources and transportation as appropriate  - Identify discharge learning needs (meds, wound care, etc )  - Arrange for interpretive services to assist at discharge as needed  - Refer to Case Management Department for coordinating discharge planning if the patient needs post-hospital services based on physician/advanced practitioner order or complex needs related to functional status, cognitive ability, or social support system  Outcome: Adequate for Discharge     Problem: POSTPARTUM  Goal: Experiences normal postpartum course  Description: INTERVENTIONS:  - Monitor maternal vital signs  - Assess uterine involution and lochia  Outcome: Adequate for Discharge  Goal: Appropriate maternal -  bonding  Description: INTERVENTIONS:  - Identify family support  - Assess for appropriate maternal/infant bonding   -Encourage maternal/infant bonding opportunities  - Referral to  or  as needed  Outcome: Adequate for Discharge  Goal: Establishment of infant feeding pattern  Description: INTERVENTIONS:  - Assess breast/bottle feeding  - Refer to lactation as needed  Outcome: Adequate for Discharge  Goal: Incision(s), wounds(s) or drain site(s) healing without S/S of infection  Description: INTERVENTIONS  - Assess and document dressing, incision, wound bed, drain sites and surrounding tissue  - Provide patient and family education  - Perform skin care/dressing changes every day  Outcome: Adequate for Discharge

## 2023-03-22 NOTE — LACTATION NOTE
This note was copied from a baby's chart  CONSULT - LACTATION  Baby Boy Carolyn Jansen 2 days male MRN: [id number]    [facility] NUR[facility] Room / Bed: (N)/ 307(N) Encounter: [id number]    Maternal Information     MOTHER:  Sofia Jansen  Maternal Age: 21 y o    OB History: # 1 - Date: 23, Sex: Male, Weight: 3435 g (7 lb 9 2 oz), GA: 41w1d, Delivery: Vaginal, Spontaneous, Apgar1: 5, Apgar5: 8, Living: Living, Birth Comments: None   Previouse breast reduction surgery? No    Lactation history:   Has patient previously breast fed: No   How long had patient previously breast fed:     Previous breast feeding complications:     History reviewed  No pertinent surgical history  Birth information:  YOB: 2025   Time of birth: 1:10 PM   Sex: male   Delivery type: Vaginal, Spontaneous   Birth Weight: 3435 g (7 lb 9 2 oz)   Percent of Weight Change: -5%     Gestational Age: 40w1d   [unfilled]    Assessment     Breast and nipple assessment: normal assessment    [place] Assessment: sleepy    Feeding assessment: no latch    Feeding recommendations:  breast feed on demand     Left Breast Soft;Filling   Right Breast Soft;Filling   Left Nipple Everted;Tender   Right Nipple Everted;Tender   Intervention Hand expression   Breastfeeding Progress Breastfeeding well  (Per Sofia)   Patient Follow-Up   Lactation Consult Status 2   Follow-Up Type Inpatient;Call as needed   Other OB Lactation Documentation    Additional Problem Noted Sofia return demonstrates needed knowledge needed to assimilate breastfeeding skills for discharge, knowing how to hold baby, reviewed hand expression, but she says she has experienced leaking with latches before  She deines nipple pain/tenderness  Integrity of skin maintained  Encouraged family to call if needed       Sofia says she had done a lot of self education with videos and by watching and supporting her sister who has a one year old who is still breastfeeding  Sofia appears comfortable and confident  With feeding and holding her baby       Skylar De La Rosa RN 3/22/2023 12:35 PM

## 2023-03-22 NOTE — PROGRESS NOTES
Progress Note - OB/GYN  Yamila Lincoln 21 y o  female MRN: [id number]  Unit/Bed#: -01 Encounter: [id number]    Assessment and Plan     Yamila Lincoln is a patient of: [facility]  She is PPD# 2 s/p  spontaneous vaginal delivery  Complicated by preeclampsia without severe features  Recovering well and is stable       Pre-eclampsia in third trimester  Assessment & Plan  Systolic (31FDN), QTU:115 , Min:129 , NVT:142   Diastolic (16JRP), GNQ:62, Min:76, Max:86    Cbc/cmp wnl PC: 1 57  asymptomatic    Positive GBS test  Assessment & Plan  S/p adequate treatment    Rh negative state in antepartum period  Assessment & Plan  Rhogam given    *  (spontaneous vaginal delivery)  Assessment & Plan  Lochia WNL   Recovering well   Appropriate bowel and bladder function   Pain well controlled   Tolerating diet   Breastfeeding   Ambulating without issues   No lower extremity tenderness  GBS pos, adequate treatment   Rh neg         Disposition    - Anticipate discharge home today      Subjective/Objective     Chief Complaint: Postpartum State     Subjective:    Yamila Lincoln is PPD#2 s/p  spontaneous vaginal delivery  She has no current complaints  Pain is well controlled  Patient is currently voiding  She is ambulating  Patient is currently passing flatus and has had no bowel movement  She is tolerating PO, and denies nausea or vomitting  Patient denies fever, chills, chest pain, shortness of breath, or calf tenderness  Lochia is normal  She is  Breastfeeding  She is recovering well and is stable         Vitals:   /86 (BP Location: Right arm)   Pulse 87   Temp 98 9 °F (37 2 °C) (Oral)   Resp 16   Ht 5' (1 524 m)   Wt 64 9 kg (143 lb)   LMP 2022 (Exact Date)   SpO2 91%   Breastfeeding Yes   BMI 27 93 kg/m²     No intake or output data in the 24 hours ending 23 0522    Invasive Devices     None                 Physical Exam:   GEN: Destiney H Inderjit appears well, alert, pleasant and cooperative   CARDIO: RRR  RESP:  Normal respiratory effort  ABDOMEN: Soft, no tenderness, no distention, fundus firm  EXTREMITIES: Non tender, no erythema, b/l Nayana's sign negative      Labs:     Hemoglobin   Date Value Ref Range Status   03/19/2023 12 0 11 5 - 15 4 g/dL Final   01/31/2023 10 8 (L) 11 5 - 15 4 g/dL Final     WBC   Date Value Ref Range Status   03/19/2023 10 96 (H) 4 31 - 10 16 Thousand/uL Final   01/31/2023 12 17 (H) 4 31 - 10 16 Thousand/uL Final     Platelets   Date Value Ref Range Status   03/19/2023 244 149 - 390 Thousands/uL Final   01/31/2023 309 149 - 390 Thousands/uL Final     Creatinine   Date Value Ref Range Status   03/19/2023 0 51 (L) 0 60 - 1 30 mg/dL Final     Comment:     Standardized to IDMS reference method     AST   Date Value Ref Range Status   03/19/2023 23 13 - 39 U/L Final     Comment:     Specimen collection should occur prior to Sulfasalazine administration due to the potential for falsely depressed results  ALT   Date Value Ref Range Status   03/19/2023 10 7 - 52 U/L Final     Comment:     Specimen collection should occur prior to Sulfasalazine administration due to the potential for falsely depressed results             Hannah Morales MD  OBGYN PGY1  3/22/2023  5:22 AM

## 2023-03-22 NOTE — PLAN OF CARE
Problem: PAIN - ADULT  Goal: Verbalizes/displays adequate comfort level or baseline comfort level  Description: Interventions:  - Encourage patient to monitor pain and request assistance  - Assess pain using appropriate pain scale  - Administer analgesics based on type and severity of pain and evaluate response  - Implement non-pharmacological measures as appropriate and evaluate response  - Consider cultural and social influences on pain and pain management  - Notify physician/advanced practitioner if interventions unsuccessful or patient reports new pain  Outcome: Progressing     Problem: INFECTION - ADULT  Goal: Absence or prevention of progression during hospitalization  Description: INTERVENTIONS:  - Assess and monitor for signs and symptoms of infection  - Monitor lab/diagnostic results  - Monitor all insertion sites, i e  indwelling lines, tubes, and drains  - Monitor endotracheal if appropriate and nasal secretions for changes in amount and color  - Whiting appropriate cooling/warming therapies per order  - Administer medications as ordered  - Instruct and encourage patient and family to use good hand hygiene technique  - Identify and instruct in appropriate isolation precautions for identified infection/condition  Outcome: Progressing  Goal: Absence of fever/infection during neutropenic period  Description: INTERVENTIONS:  - Monitor WBC    Outcome: Progressing     Problem: SAFETY ADULT  Goal: Patient will remain free of falls  Description: INTERVENTIONS:  - Educate patient/family on patient safety including physical limitations  - Instruct patient to call for assistance with activity   - Consult OT/PT to assist with strengthening/mobility   - Keep Call bell within reach  - Keep bed low and locked with side rails adjusted as appropriate  - Keep care items and personal belongings within reach  - Initiate and maintain comfort rounds  - Make Fall Risk Sign visible to staff  - Offer Toileting every  Hours, in advance of need  - Initiate/Maintain alarm  - Obtain necessary fall risk management equipment:   - Apply yellow socks and bracelet for high fall risk patients  - Consider moving patient to room near nurses station  Outcome: Progressing  Goal: Maintain or return to baseline ADL function  Description: INTERVENTIONS:  -  Assess patient's ability to carry out ADLs; assess patient's baseline for ADL function and identify physical deficits which impact ability to perform ADLs (bathing, care of mouth/teeth, toileting, grooming, dressing, etc )  - Assess/evaluate cause of self-care deficits   - Assess range of motion  - Assess patient's mobility; develop plan if impaired  - Assess patient's need for assistive devices and provide as appropriate  - Encourage maximum independence but intervene and supervise when necessary  - Involve family in performance of ADLs  - Assess for home care needs following discharge   - Consider OT consult to assist with ADL evaluation and planning for discharge  - Provide patient education as appropriate  Outcome: Progressing  Goal: Maintains/Returns to pre admission functional level  Description: INTERVENTIONS:  - Perform BMAT or MOVE assessment daily    - Set and communicate daily mobility goal to care team and patient/family/caregiver  - Collaborate with rehabilitation services on mobility goals if consulted  - Perform Range of Motion  times a day  - Reposition patient every  hours    - Dangle patient  times a day  - Stand patient  times a day  - Ambulate patient  times a day  - Out of bed to chair  times a day   - Out of bed for meals  times a day  - Out of bed for toileting  - Record patient progress and toleration of activity level   Outcome: Progressing     Problem: DISCHARGE PLANNING  Goal: Discharge to home or other facility with appropriate resources  Description: INTERVENTIONS:  - Identify barriers to discharge w/patient and caregiver  - Arrange for needed discharge resources and transportation as appropriate  - Identify discharge learning needs (meds, wound care, etc )  - Arrange for interpretive services to assist at discharge as needed  - Refer to Case Management Department for coordinating discharge planning if the patient needs post-hospital services based on physician/advanced practitioner order or complex needs related to functional status, cognitive ability, or social support system  Outcome: Progressing     Problem: POSTPARTUM  Goal: Experiences normal postpartum course  Description: INTERVENTIONS:  - Monitor maternal vital signs  - Assess uterine involution and lochia  Outcome: Progressing  Goal: Appropriate maternal -  bonding  Description: INTERVENTIONS:  - Identify family support  - Assess for appropriate maternal/infant bonding   -Encourage maternal/infant bonding opportunities  - Referral to  or  as needed  Outcome: Progressing  Goal: Establishment of infant feeding pattern  Description: INTERVENTIONS:  - Assess breast/bottle feeding  - Refer to lactation as needed  Outcome: Progressing  Goal: Incision(s), wounds(s) or drain site(s) healing without S/S of infection  Description: INTERVENTIONS  - Assess and document dressing, incision, wound bed, drain sites and surrounding tissue  - Provide patient and family education  - Perform skin care/dressing changes every   Outcome: Progressing

## 2023-03-23 ENCOUNTER — TRANSITIONAL CARE MANAGEMENT (OUTPATIENT)
Dept: FAMILY MEDICINE CLINIC | Facility: CLINIC | Age: 18
End: 2023-03-23

## 2023-03-23 NOTE — UTILIZATION REVIEW
NOTIFICATION OF ADMISSION DISCHARGE   This is a Notification of Discharge from [facility]  Please be advised that this patient has been discharge from our facility  Below you will find the admission and discharge date and time including the patient’s disposition  UTILIZATION REVIEW CONTACT:  Harpreet Gallardo  Utilization   Network Utilization Review Department  Phone: [phone] x carefully listen to the prompts  All voicemails are confidential   Email: [email or web address]     ADMISSION INFORMATION  PRESENTATION DATE: 3/19/2023  3:44 PM  OBERVATION ADMISSION DATE:   INPATIENT ADMISSION DATE: 3/19/23  3:44 PM   DISCHARGE DATE: 3/22/2023  7:20 PM   DISPOSITION:Home/Self Care    IMPORTANT INFORMATION:  Send all requests for admission clinical reviews, approved or denied determinations and any other requests to dedicated fax number below belonging to the campus where the patient is receiving treatment   List of dedicated fax numbers:  [address] DENIALS (Administrative/Medical Necessity) [phone]   [address] (Maternity/NICU/Pediatrics) [phone]   [facility] [phone]   [facility] [phone]   [address] [phone]   220 [facility] [phone]   90 [facility] [phone]   [address] [place] [address] 119 [phone]   [facility]  [phone]   40[address] [facility] [phone]   412 [facility] [address] [facility] [phone]

## 2023-03-26 LAB — PLACENTA IN STORAGE: NORMAL

## 2023-03-29 NOTE — PROGRESS NOTES
Luis Alfredo Fontaine  2005    S:  21 y o  female here for postpartum BP check  She is s/p Vaginal delivery complicated by a < 30 s shoulder dystocia on 3/20/23  PreE without SF  1st degree laceration was repaired  Denies any perineal or vulvar pain  No abnormal vaginal discharge  BP today 136/86  Similar pressure with family medicine yesterday  Denies HA, dizziness, blurry vision or epigastric pain  Gender: male (Barb Calabrese-His name means made in the image of God in [place])  Doing well  Apgars: 5,8  Weight: 7 lbs 9 2 oz  Weighs 6 lbs 15 oz  Pedi appt tomorrow  Her lochia has light in amount and color  She is Breastfeeding without problems  She denies postpartum blues/depression  Her EPDS is 1  Rhogam received postpartum on 3/20/23  Last Pap: N/A    O:   LMP 06/04/2022 (Exact Date)   She appears well and in no distress    A/P:  Postpartum visit  Return to office in one week for BP check  Discussed signs of elevated BP of headache, dizziness, blurry vision or epigastric pain  BP of 140/90 is abnormal    Continue prenatal vitamin daily while breastfeeding  If symptoms of depression occur, please call the office to schedule an appt  This may happen up until your baby's first birthday

## 2023-03-30 ENCOUNTER — POSTPARTUM VISIT (OUTPATIENT)
Dept: OBGYN CLINIC | Facility: MEDICAL CENTER | Age: 18
End: 2023-03-30

## 2023-03-30 VITALS — BODY MASS INDEX: 23.44 KG/M2 | DIASTOLIC BLOOD PRESSURE: 86 MMHG | WEIGHT: 120 LBS | SYSTOLIC BLOOD PRESSURE: 136 MMHG

## 2023-03-30 DIAGNOSIS — Z87.59 HISTORY OF PRE-ECLAMPSIA: ICD-10-CM

## 2023-03-30 NOTE — PATIENT INSTRUCTIONS
Return to office in one week for BP check  Discussed signs of elevated BP of headache, dizziness, blurry vision or epigastric pain  BP of 140/90 is abnormal    Continue prenatal vitamin daily while breastfeeding  If symptoms of depression occur, please call the office to schedule an appt  This may happen up until your baby's first birthday

## 2023-03-31 ENCOUNTER — DOCUMENTATION (OUTPATIENT)
Dept: FAMILY MEDICINE CLINIC | Facility: CLINIC | Age: 18
End: 2023-03-31

## 2023-03-31 NOTE — PROGRESS NOTES
I tried to call patient but got Sara Starks for patient to call to set up annual physical  Patient last seen in office 02/24/2022

## 2023-04-26 ENCOUNTER — OFFICE VISIT (OUTPATIENT)
Dept: OBGYN CLINIC | Facility: MEDICAL CENTER | Age: 18
End: 2023-04-26

## 2023-04-26 VITALS — BODY MASS INDEX: 22.07 KG/M2 | SYSTOLIC BLOOD PRESSURE: 118 MMHG | DIASTOLIC BLOOD PRESSURE: 82 MMHG | WEIGHT: 113 LBS

## 2023-04-26 PROBLEM — Z33.1 ADOLESCENT PREGNANCY, INCIDENTAL: Status: RESOLVED | Noted: 2022-09-08 | Resolved: 2023-04-26

## 2023-04-26 PROBLEM — O48.0 41 WEEKS GESTATION OF PREGNANCY: Status: RESOLVED | Noted: 2022-08-29 | Resolved: 2023-04-26

## 2023-04-26 PROBLEM — Z3A.41 41 WEEKS GESTATION OF PREGNANCY: Status: RESOLVED | Noted: 2022-08-29 | Resolved: 2023-04-26

## 2023-04-26 PROBLEM — Z34.93 PRENATAL CARE IN THIRD TRIMESTER: Status: RESOLVED | Noted: 2022-08-29 | Resolved: 2023-04-26

## 2023-04-26 PROBLEM — O14.93 PRE-ECLAMPSIA IN THIRD TRIMESTER: Status: RESOLVED | Noted: 2023-03-21 | Resolved: 2023-04-26

## 2023-04-26 PROBLEM — Z30.09 SCREENING AND EVALUATION FOR FEMALE STERILIZATION: Status: RESOLVED | Noted: 2022-12-21 | Resolved: 2023-04-26

## 2023-04-26 NOTE — PATIENT INSTRUCTIONS
May gradually resume normal activities  Continue prenatal vitamin daily while breastfeeding  Consider taking prenatal vitamin 6-12 months before a future pregnancy to reduce risk of neural tube defect  Always condom use for pregnancy prevention  Resume sexual activity once comfortable and protected to do so  While breastfeeding,  may need to use a lubricant with sex due to vaginal dryness  Consider kegels exercises starting at 6 weeks  20 times twice daily  If symptoms of depression occur, please call the office to schedule an appt  This may happen up until your baby's first birthday  Return to office in 3-4 months for yearly exam, sooner as needed

## 2023-04-26 NOTE — PROGRESS NOTES
Yamila Lincoln  2005    S:  21 y o  female here for postpartum BP check  She is s/p Vaginal delivery complicated by a < 30 s shoulder dystocia on 3/20/23  PreE without SF  1st degree laceration was repaired  Denies any perineal or vulvar pain  No abnormal vaginal discharge  Last seen in office on 3/30/23 with BP of 136/86  Was supposed to follow up in one week for BP check but was ill and didn't return  BP today of 118/82  Gender: male (Barb Calabrese-His name means made in the image of God in [place])  Doing well  Apgars: 5,8  Weight: 7 lbs 9 2 oz  Weighs 6 lbs 15 oz  At last pedi appt  Has one upcoming 5/1/23  Her lochia has light in amount and color  She is breastfeeding without problems  She denies postpartum blues/depression  Her EPDS is 0  Denies SI/HI  Rhogam received postpartum on 3/20/23  Last Pap: N/A  Has not been sexually active  We discussed contraceptive options of  birth control pills, patches, NuvaRing, DepoProvera, Mirena/Kyleena IUD, Nexplanon in detail  At this point she would like to use condoms  If she reconsiders, she will return to the office  O:   LMP 06/04/2022 (Exact Date)   She appears well and in no distress  Abdomen is soft and nontender  External genitals are normal  Vagina is normal  Cervix, uterus and adnexa are nontender, no masses palpable  Vaginal tone 1/5  A/P:  Postpartum visit  May gradually resume normal activities  Continue prenatal vitamin daily while breastfeeding  Consider taking prenatal vitamin 6-12 months before a future pregnancy to reduce risk of neural tube defect  Always condom use for pregnancy prevention  Resume sexual activity once comfortable and protected to do so  While breastfeeding,  may need to use a lubricant with sex due to vaginal dryness  Consider kegels exercises starting at 6 weeks  20 times twice daily  If symptoms of depression occur, please call the office to schedule an appt   This may happen up until your baby's first birthday  Return to office in 3-4 months for yearly exam, sooner as needed

## 2023-05-25 ENCOUNTER — DOCUMENTATION (OUTPATIENT)
Dept: FAMILY MEDICINE CLINIC | Facility: CLINIC | Age: 18
End: 2023-05-25

## 2023-12-28 ENCOUNTER — PATIENT MESSAGE (OUTPATIENT)
Dept: OBGYN CLINIC | Facility: MEDICAL CENTER | Age: 18
End: 2023-12-28

## 2024-01-26 ENCOUNTER — TELEPHONE (OUTPATIENT)
Dept: OBGYN CLINIC | Facility: CLINIC | Age: 19
End: 2024-01-26

## 2025-02-18 ENCOUNTER — NURSE TRIAGE (OUTPATIENT)
Age: 20
End: 2025-02-18

## 2025-02-18 NOTE — TELEPHONE ENCOUNTER
"Pt called in with concerns for vaginal bleeding. LMP was 2/1/25 and her periods are regular. Last night she experienced spotting x1 with wiping, has not continued. Denies any pain or clots. States she has been having intermittent nausea and lightheadedness for the last few months. Encouraged pt to follow up with PCP for this. Advised to continue to monitor and call back if she finds irregular bleeding continues in future cycles or she has any other concerns/questions. Pt is also due for a yearly visit, scheduled for 2/20/25.     Reason for Disposition   Normal menstrual flow    Answer Assessment - Initial Assessment Questions  1. BLEEDING SEVERITY: \"Describe the bleeding that you are having.\" \"How much bleeding is there?\"       Just with wiping x1 last night  2. ONSET: \"When did the bleeding begin?\" \"Is it continuing now?\"      See above  3. MENSTRUAL PERIOD: \"When was the last normal menstrual period?\" \"How is this different than your period?\"      2/1/25  4. REGULARITY: \"How regular are your periods?\"      regular  5. ABDOMEN PAIN: \"Do you have any pain?\" \"How bad is the pain?\"  (e.g., Scale 0-10; none, mild, moderate, or severe)      denies  6. PREGNANCY: \"Is there any chance you are pregnant?\" \"When was your last menstrual period?\"      denies  7. BREASTFEEDING: \"Are you breastfeeding?\"      denies  8. HORMONE MEDICINES: \"Are you taking any hormone medicines, prescription or over-the-counter?\" (e.g., birth control pills, estrogen)      denies  9. BLOOD THINNER MEDICINES: \"Do you take any blood thinners?\" (e.g., Coumadin / warfarin, Pradaxa / dabigatran, aspirin)      denies  10. CAUSE: \"What do you think is causing the bleeding?\" (e.g., recent gyn surgery, recent gyn procedure; known bleeding disorder, cervical cancer, polycystic ovarian disease, fibroids)          unsure  11. HEMODYNAMIC STATUS: \"Are you weak or feeling lightheaded?\" If Yes, ask: \"Can you stand and walk normally?\"         Slightly lightheaded " "and nauseous intermittently for the last 3 months  12. OTHER SYMPTOMS: \"What other symptoms are you having with the bleeding?\" (e.g., passed tissue, vaginal discharge, fever, menstrual-type cramps)        denies    Protocols used: Vaginal Bleeding - Abnormal-Adult-OH    "

## 2025-02-20 ENCOUNTER — ANNUAL EXAM (OUTPATIENT)
Dept: OBGYN CLINIC | Facility: MEDICAL CENTER | Age: 20
End: 2025-02-20
Payer: COMMERCIAL

## 2025-02-20 VITALS
HEIGHT: 60 IN | BODY MASS INDEX: 22.19 KG/M2 | WEIGHT: 113 LBS | SYSTOLIC BLOOD PRESSURE: 132 MMHG | DIASTOLIC BLOOD PRESSURE: 80 MMHG

## 2025-02-20 DIAGNOSIS — N92.1 BREAKTHROUGH BLEEDING: ICD-10-CM

## 2025-02-20 DIAGNOSIS — Z12.4 ENCOUNTER FOR PAPANICOLAOU SMEAR FOR CERVICAL CANCER SCREENING: ICD-10-CM

## 2025-02-20 DIAGNOSIS — Z01.419 ENCNTR FOR GYN EXAM (GENERAL) (ROUTINE) W/O ABN FINDINGS: Primary | ICD-10-CM

## 2025-02-20 PROBLEM — Z67.91 RH NEGATIVE STATE IN ANTEPARTUM PERIOD: Status: RESOLVED | Noted: 2023-01-31 | Resolved: 2025-02-20

## 2025-02-20 PROBLEM — O99.013 ANEMIA AFFECTING PREGNANCY IN THIRD TRIMESTER: Status: RESOLVED | Noted: 2023-01-17 | Resolved: 2025-02-20

## 2025-02-20 PROBLEM — Z87.59 HISTORY OF SHOULDER DYSTOCIA IN PRIOR PREGNANCY: Status: RESOLVED | Noted: 2023-03-20 | Resolved: 2025-02-20

## 2025-02-20 PROBLEM — O43.193 MARGINAL INSERTION OF UMBILICAL CORD AFFECTING MANAGEMENT OF MOTHER IN THIRD TRIMESTER: Status: RESOLVED | Noted: 2022-10-25 | Resolved: 2025-02-20

## 2025-02-20 PROBLEM — O26.899 RH NEGATIVE STATE IN ANTEPARTUM PERIOD: Status: RESOLVED | Noted: 2023-01-31 | Resolved: 2025-02-20

## 2025-02-20 PROBLEM — B95.1 POSITIVE GBS TEST: Status: RESOLVED | Noted: 2023-02-22 | Resolved: 2025-02-20

## 2025-02-20 LAB — SL AMB POCT URINE HCG: NEGATIVE

## 2025-02-20 PROCEDURE — 99395 PREV VISIT EST AGE 18-39: CPT | Performed by: NURSE PRACTITIONER

## 2025-02-20 PROCEDURE — 81025 URINE PREGNANCY TEST: CPT | Performed by: NURSE PRACTITIONER

## 2025-02-20 PROCEDURE — G0145 SCR C/V CYTO,THINLAYER,RESCR: HCPCS | Performed by: NURSE PRACTITIONER

## 2025-02-20 NOTE — PATIENT INSTRUCTIONS
Negative UPT in office today.   Pap smear  collected today.   Gonorrhea and chlamydia cultures denied.   Always condom use when sexually active.   Birth control options discussed. Aware to not have sex 2 weeks before an IUD insertion. ACHES reviewed along with benefits, risks and alternatives discussed.   Use seat belt in every car ride, avoid smoking and alcohol use.   Exercise most days of the week-minimum of 150 minutes per week.   Obtain appropriate nutrition and hydration.   Follow up with PCP for appropriate vaccine schedule. HPV vaccine series has not been completed.Gardisil recommended for patients ages 9-45.   Age 19-50 calcium 1000mg daily intake. Vit D daily recommended.   Monthly breast self exam  is recommended.   Call your insurance company to verify coverage prior to completing any ordered tests.   Return to office in one year or sooner, if needed.

## 2025-02-20 NOTE — PROGRESS NOTES
Assessment/Plan:  Negative UPT in office today.   Pap smear  collected today.   Gonorrhea and chlamydia cultures denied.   Always condom use when sexually active.   Birth control options discussed. Aware to not have sex 2 weeks before an IUD insertion. ACHES reviewed along with benefits, risks and alternatives discussed.   Use seat belt in every car ride, avoid smoking and alcohol use.   Exercise most days of the week-minimum of 150 minutes per week.   Obtain appropriate nutrition and hydration.   Follow up with PCP for appropriate vaccine schedule. HPV vaccine series has not been completed.Gardisil recommended for patients ages 9-45.   Age 19-50 calcium 1000mg daily intake. Vit D daily recommended.   Monthly breast self exam  is recommended.   Call your insurance company to verify coverage prior to completing any ordered tests.   Return to office in one year or sooner, if needed.        1. Encntr for gyn exam (general) (routine) w/o abn findings  2. Encounter for Papanicolaou smear for cervical cancer screening  -     Liquid-based pap, screening  3. Breakthrough bleeding  -     POCT urine HCG             Subjective:      Patient ID: Destiney Jansen is a 19 y.o. female.    HPI    Destiney Jansen is a 19 y.o.  (1.4 yo son) female who is here today for her annual visit. No gynecologic health concerns.   Monthly menses x 5-6 days with mod flow. Had recent intermenstrual bleeding this month which is not typical. Menses is acceptable.  Exercise- 2 times per week.   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 She has support at home.   Lives with her boyfriend, son and her dad.     Destiney Jansen is sexually active with male partner/boyfriend of 6 years. Monogamous and feels safe in this relationship. Denies vaginal pain,bleeding or dryness.    She uses withdraw for contraception.  Pregnancy is not currently desired.   She is not interested in STD screening today.   She denies vaginal discharge, itching or pelvic  pain.   She has no  urinary concerns, does not have incontinence.  No bowel concerns.  No breast concerns.     Last pap: Not on file  Mammogram: Not on file   Colonoscopy: Not on file  DEXA scan: Not on file  HPV vaccine series:No    Family history of cancer:   Cancer-related family history includes Cancer in her paternal grandfather; Ovarian cancer in her paternal grandmother. There is no history of Breast cancer or Colon cancer.      The following portions of the patient's history were reviewed and updated as appropriate: allergies, current medications, past family history, past medical history, past social history, past surgical history, and problem list.    Review of Systems   Constitutional: Negative.  Negative for activity change, appetite change, chills, diaphoresis, fatigue, fever and unexpected weight change.   HENT:  Negative for congestion, dental problem, sneezing, sore throat and trouble swallowing.    Eyes:  Negative for visual disturbance.   Respiratory:  Negative for chest tightness and shortness of breath.    Cardiovascular:  Negative for chest pain and leg swelling.   Gastrointestinal:  Negative for abdominal pain, constipation, diarrhea, nausea and vomiting.   Genitourinary:  Negative for difficulty urinating, dyspareunia, dysuria, frequency, hematuria, menstrual problem, pelvic pain, urgency, vaginal bleeding, vaginal discharge and vaginal pain.   Musculoskeletal:  Negative for back pain and neck pain.   Skin: Negative.    Allergic/Immunologic: Negative.    Neurological:  Negative for weakness and headaches.   Hematological:  Negative for adenopathy.   Psychiatric/Behavioral: Negative.           Objective:      /80 (BP Location: Left arm, Patient Position: Sitting, Cuff Size: Standard)   Ht 5' (1.524 m)   Wt 51.3 kg (113 lb)   LMP 02/01/2025 (Exact Date)   Breastfeeding No   BMI 22.07 kg/m²          Physical Exam  Vitals and nursing note reviewed.   Constitutional:       Appearance:  Normal appearance. She is well-developed.   HENT:      Head: Normocephalic and atraumatic.   Eyes:      General:         Right eye: No discharge.         Left eye: No discharge.   Neck:      Thyroid: No thyromegaly.      Trachea: Trachea normal.   Cardiovascular:      Rate and Rhythm: Normal rate and regular rhythm.      Heart sounds: Normal heart sounds.   Pulmonary:      Effort: Pulmonary effort is normal.      Breath sounds: Normal breath sounds.   Chest:   Breasts:     Breasts are symmetrical.      Right: Normal. No inverted nipple, mass, nipple discharge, skin change or tenderness.      Left: Normal. No inverted nipple, mass, nipple discharge, skin change or tenderness.   Abdominal:      Palpations: Abdomen is soft.   Genitourinary:     General: Normal vulva.      Exam position: Lithotomy position.      Labia:         Right: No rash, tenderness, lesion or injury.         Left: No rash, tenderness, lesion or injury.       Urethra: No prolapse, urethral pain, urethral swelling or urethral lesion.      Vagina: No signs of injury and foreign body. Bleeding (scant light brown) present. No vaginal discharge, erythema or tenderness.      Cervix: Normal.      Uterus: Normal.       Adnexa:         Right: No mass, tenderness or fullness.          Left: No mass, tenderness or fullness.        Rectum: No external hemorrhoid.          Comments: Lifetime hypopigmentation on right labia majora  Musculoskeletal:         General: Normal range of motion.      Cervical back: Normal range of motion and neck supple.   Lymphadenopathy:      Head:      Right side of head: No submental, submandibular or tonsillar adenopathy.      Left side of head: No submental, submandibular or tonsillar adenopathy.      Cervical: No cervical adenopathy.      Upper Body:      Right upper body: No supraclavicular or axillary adenopathy.      Left upper body: No supraclavicular or axillary adenopathy.      Lower Body: No right inguinal adenopathy. No  left inguinal adenopathy.   Skin:     General: Skin is warm and dry.   Neurological:      Mental Status: She is alert and oriented to person, place, and time.   Psychiatric:         Mood and Affect: Mood normal.         Behavior: Behavior normal.

## 2025-02-27 ENCOUNTER — RESULTS FOLLOW-UP (OUTPATIENT)
Dept: OBGYN CLINIC | Facility: MEDICAL CENTER | Age: 20
End: 2025-02-27

## 2025-02-27 LAB
LAB AP GYN PRIMARY INTERPRETATION: NORMAL
Lab: NORMAL

## 2025-03-06 ENCOUNTER — TELEPHONE (OUTPATIENT)
Dept: FAMILY MEDICINE CLINIC | Facility: CLINIC | Age: 20
End: 2025-03-06

## 2025-03-31 ENCOUNTER — TELEPHONE (OUTPATIENT)
Dept: FAMILY MEDICINE CLINIC | Facility: CLINIC | Age: 20
End: 2025-03-31

## 2025-03-31 NOTE — TELEPHONE ENCOUNTER
03/31/25 1:25 PM        The office's request has been received, reviewed, and the patient chart updated. The PCP has successfully been removed with a patient attribution note. This message will now be completed.        Thank you  Emma Block